# Patient Record
Sex: FEMALE | Race: WHITE | NOT HISPANIC OR LATINO | Employment: OTHER | URBAN - METROPOLITAN AREA
[De-identification: names, ages, dates, MRNs, and addresses within clinical notes are randomized per-mention and may not be internally consistent; named-entity substitution may affect disease eponyms.]

---

## 2023-04-18 ENCOUNTER — TELEPHONE (OUTPATIENT)
Dept: PRIMARY CARE | Facility: CLINIC | Age: 78
End: 2023-04-18
Payer: MEDICARE

## 2023-04-18 DIAGNOSIS — J06.9 ACUTE URI: Primary | ICD-10-CM

## 2023-04-18 RX ORDER — AZITHROMYCIN 250 MG/1
TABLET, FILM COATED ORAL
Qty: 6 TABLET | Refills: 0 | Status: SHIPPED | OUTPATIENT
Start: 2023-04-18 | End: 2023-04-23

## 2023-04-18 NOTE — TELEPHONE ENCOUNTER
Pt's  was diagnosed with two viruses (Macario Thacker- Dr Peralta's pt) and was given a Z pack. She woke up this morning 4/18 with the same symptoms as him  Sore throat, body aches, congestion, fever.   Could you send something to [facility]? She is COVID negative

## 2023-05-05 PROBLEM — I25.10 ARTERIOSCLEROSIS OF CORONARY ARTERY: Status: ACTIVE | Noted: 2023-05-05

## 2023-05-05 PROBLEM — D64.9 ANEMIA: Status: ACTIVE | Noted: 2023-05-05

## 2023-05-05 PROBLEM — R00.0 WIDE-COMPLEX TACHYCARDIA: Status: ACTIVE | Noted: 2023-05-05

## 2023-05-05 PROBLEM — M79.2 NEURALGIA: Status: ACTIVE | Noted: 2023-05-05

## 2023-05-05 PROBLEM — R05.9 COUGH: Status: ACTIVE | Noted: 2023-05-05

## 2023-05-05 PROBLEM — I10 BENIGN ESSENTIAL HYPERTENSION: Status: ACTIVE | Noted: 2023-05-05

## 2023-05-05 PROBLEM — M25.512 LEFT SHOULDER PAIN: Status: ACTIVE | Noted: 2023-05-05

## 2023-05-05 PROBLEM — I35.0 AORTIC VALVE STENOSIS: Status: ACTIVE | Noted: 2023-05-05

## 2023-05-05 PROBLEM — E78.5 HYPERLIPIDEMIA: Status: ACTIVE | Noted: 2023-05-05

## 2023-05-05 PROBLEM — J10.1 INFLUENZA A: Status: ACTIVE | Noted: 2023-05-05

## 2023-05-05 PROBLEM — J30.9 ALLERGIC RHINITIS: Status: ACTIVE | Noted: 2023-05-05

## 2023-05-05 PROBLEM — Z98.61 POST PTCA: Status: ACTIVE | Noted: 2023-05-05

## 2023-05-05 PROBLEM — Z86.79 HISTORY OF PSVT (PAROXYSMAL SUPRAVENTRICULAR TACHYCARDIA): Status: ACTIVE | Noted: 2023-05-05

## 2023-05-05 PROBLEM — R01.1 MURMUR: Status: ACTIVE | Noted: 2023-05-05

## 2023-05-05 RX ORDER — OSELTAMIVIR PHOSPHATE 75 MG/1
1 CAPSULE ORAL 2 TIMES DAILY
COMMUNITY
Start: 2022-12-01 | End: 2023-06-01 | Stop reason: WASHOUT

## 2023-05-05 RX ORDER — ATORVASTATIN CALCIUM 20 MG/1
1 TABLET, FILM COATED ORAL NIGHTLY
COMMUNITY
Start: 2020-02-18 | End: 2024-01-24 | Stop reason: SDUPTHER

## 2023-05-05 RX ORDER — GABAPENTIN 300 MG/1
CAPSULE ORAL
COMMUNITY
End: 2023-06-01 | Stop reason: WASHOUT

## 2023-05-05 RX ORDER — ASPIRIN 81 MG/1
TABLET ORAL
COMMUNITY
Start: 2020-02-18

## 2023-05-05 RX ORDER — MULTIVITAMIN
1 TABLET ORAL DAILY
COMMUNITY

## 2023-05-05 RX ORDER — METOPROLOL TARTRATE 25 MG/1
0.5 TABLET, FILM COATED ORAL 2 TIMES DAILY
COMMUNITY
End: 2024-01-24 | Stop reason: SDUPTHER

## 2023-05-05 RX ORDER — LOSARTAN POTASSIUM 50 MG/1
1 TABLET ORAL 2 TIMES DAILY
COMMUNITY
Start: 2019-02-14 | End: 2024-01-24 | Stop reason: SDUPTHER

## 2023-05-05 RX ORDER — ACETAMINOPHEN AND CODEINE PHOSPHATE 300; 30 MG/1; MG/1
TABLET ORAL EVERY 6 HOURS
COMMUNITY
Start: 2019-10-22 | End: 2023-06-01 | Stop reason: SDUPTHER

## 2023-06-01 ENCOUNTER — OFFICE VISIT (OUTPATIENT)
Dept: PRIMARY CARE | Facility: CLINIC | Age: 78
End: 2023-06-01
Payer: MEDICARE

## 2023-06-01 VITALS
RESPIRATION RATE: 16 BRPM | HEART RATE: 72 BPM | TEMPERATURE: 96.9 F | OXYGEN SATURATION: 96 % | WEIGHT: 157 LBS | DIASTOLIC BLOOD PRESSURE: 80 MMHG | BODY MASS INDEX: 26.8 KG/M2 | HEIGHT: 64 IN | SYSTOLIC BLOOD PRESSURE: 138 MMHG

## 2023-06-01 DIAGNOSIS — G89.29 CHRONIC PAIN OF LEFT KNEE: ICD-10-CM

## 2023-06-01 DIAGNOSIS — M25.562 CHRONIC PAIN OF LEFT KNEE: ICD-10-CM

## 2023-06-01 DIAGNOSIS — G89.29 CHRONIC LEFT SHOULDER PAIN: ICD-10-CM

## 2023-06-01 DIAGNOSIS — Z23 NEED FOR SHINGLES VACCINE: Primary | ICD-10-CM

## 2023-06-01 DIAGNOSIS — M25.512 CHRONIC LEFT SHOULDER PAIN: ICD-10-CM

## 2023-06-01 PROBLEM — E86.0 DEHYDRATION: Status: ACTIVE | Noted: 2020-11-16

## 2023-06-01 PROBLEM — J10.1 INFLUENZA A: Status: RESOLVED | Noted: 2023-05-05 | Resolved: 2023-06-01

## 2023-06-01 PROBLEM — R05.9 COUGH: Status: RESOLVED | Noted: 2023-05-05 | Resolved: 2023-06-01

## 2023-06-01 PROBLEM — E86.0 DEHYDRATION: Status: RESOLVED | Noted: 2020-11-16 | Resolved: 2023-06-01

## 2023-06-01 PROCEDURE — 1159F MED LIST DOCD IN RCRD: CPT | Performed by: FAMILY MEDICINE

## 2023-06-01 PROCEDURE — 20610 DRAIN/INJ JOINT/BURSA W/O US: CPT | Performed by: FAMILY MEDICINE

## 2023-06-01 PROCEDURE — 3075F SYST BP GE 130 - 139MM HG: CPT | Performed by: FAMILY MEDICINE

## 2023-06-01 PROCEDURE — 99213 OFFICE O/P EST LOW 20 MIN: CPT | Performed by: FAMILY MEDICINE

## 2023-06-01 PROCEDURE — 1160F RVW MEDS BY RX/DR IN RCRD: CPT | Performed by: FAMILY MEDICINE

## 2023-06-01 PROCEDURE — 3079F DIAST BP 80-89 MM HG: CPT | Performed by: FAMILY MEDICINE

## 2023-06-01 PROCEDURE — 1157F ADVNC CARE PLAN IN RCRD: CPT | Performed by: FAMILY MEDICINE

## 2023-06-01 PROCEDURE — 1036F TOBACCO NON-USER: CPT | Performed by: FAMILY MEDICINE

## 2023-06-01 RX ORDER — ACETAMINOPHEN AND CODEINE PHOSPHATE 300; 30 MG/1; MG/1
1 TABLET ORAL EVERY 6 HOURS PRN
Qty: 24 TABLET | Refills: 0 | Status: SHIPPED | OUTPATIENT
Start: 2023-06-01 | End: 2023-06-07

## 2023-06-01 SDOH — ECONOMIC STABILITY: FOOD INSECURITY: WITHIN THE PAST 12 MONTHS, YOU WORRIED THAT YOUR FOOD WOULD RUN OUT BEFORE YOU GOT MONEY TO BUY MORE.: NEVER TRUE

## 2023-06-01 SDOH — ECONOMIC STABILITY: FOOD INSECURITY: WITHIN THE PAST 12 MONTHS, THE FOOD YOU BOUGHT JUST DIDN'T LAST AND YOU DIDN'T HAVE MONEY TO GET MORE.: NEVER TRUE

## 2023-06-01 ASSESSMENT — ENCOUNTER SYMPTOMS
LOSS OF SENSATION IN FEET: 0
OCCASIONAL FEELINGS OF UNSTEADINESS: 1
DEPRESSION: 0

## 2023-06-01 ASSESSMENT — LIFESTYLE VARIABLES
SKIP TO QUESTIONS 9-10: 1
HOW MANY STANDARD DRINKS CONTAINING ALCOHOL DO YOU HAVE ON A TYPICAL DAY: 1 OR 2
HOW OFTEN DO YOU HAVE SIX OR MORE DRINKS ON ONE OCCASION: NEVER
AUDIT-C TOTAL SCORE: 2
HOW OFTEN DO YOU HAVE A DRINK CONTAINING ALCOHOL: 2-4 TIMES A MONTH

## 2023-06-01 ASSESSMENT — PATIENT HEALTH QUESTIONNAIRE - PHQ9
1. LITTLE INTEREST OR PLEASURE IN DOING THINGS: NOT AT ALL
2. FEELING DOWN, DEPRESSED OR HOPELESS: NOT AT ALL
SUM OF ALL RESPONSES TO PHQ9 QUESTIONS 1 & 2: 0

## 2023-06-01 NOTE — PATIENT INSTRUCTIONS
Thank you for choosing [facility] for your healthcare.   As always if you have any questions or concerns please do not hesitate to call our office at [phone] or through [employer].    Have a great day!  Nancy Lao, DO

## 2023-06-01 NOTE — ASSESSMENT & PLAN NOTE
Flairs up intermittently  Rx tylenol with codeine #24 tabs provided  CSA signed. UDS not indicated for short-term rx

## 2023-06-01 NOTE — PROGRESS NOTES
Subjective   Patient ID: Nat Thacker is a 78 y.o. female who presents for Follow-up (6 month follow-up. Asking about shingles vaccine.), Hip Pain (L side x 2 months), and Knee Pain (L side X 2 months).  Left shoulder pain  She has intermittent left shoulder pain. She occasionally takes Tylenol with codeine when the pain is severe. SHe has not needed it for several months but would like a refill to have on hand for future.     She is concerned about left knee pain that started about 2 months ago. She has also been having left buttock pain possibly from walking differently due to the knee pain. She is cautious on the stairs. She can still do everything she needs to do.       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 She was scheduled for colostomy take down at the end of February 2020 but the surgery was postponed due to CAD. She underwent colostomy take down in August 2020 which was complicated by leaking at anastomosis and required ileostomy. She had a hysterectomy at the same time. On December 8th 2020 she had ileostomy take down and has fully recovered.         Current Outpatient Medications   Medication Sig Dispense Refill    aspirin 81 mg EC tablet Take by mouth.      atorvastatin (Lipitor) 20 mg tablet Take 1 tablet (20 mg) by mouth once daily at bedtime.      losartan (Cozaar) 50 mg tablet Take 1 tablet (50 mg) by mouth 2 times a day.      metoprolol tartrate (Lopressor) 25 mg tablet Take 0.5 tablets (12.5 mg) by mouth 2 times a day.      multivitamin tablet Take 1 tablet by mouth once daily.      acetaminophen-codeine (Tylenol w/ Codeine #3) 300-30 mg tablet Take 1 tablet by mouth every 6 hours if needed for severe pain (7 - 10) for up to 6 days. 24 tablet 0    zoster vaccine-recombinant  "adjuvanted (Shingrix) 50 mcg/0.5 mL vaccine Inject 0.5 mL into the shoulder, thigh, or buttocks 1 time for 1 dose. 0.5 mL 1     No current facility-administered medications for this visit.       Objective     Visit Vitals  /80 (BP Location: Right arm, Patient Position: Sitting, BP Cuff Size: Large adult)   Pulse 72   Temp 36.1 °C (96.9 °F)   Resp 16   Ht 1.626 m (5' 4\")   Wt 71.2 kg (157 lb)   SpO2 96%   BMI 26.95 kg/m²   Smoking Status Former   BSA 1.79 m²        Constitutional: Well nourished, well developed, appears stated age  Eyes: no scleral icterus, no conjunctival injection  Ears: normal external auditory canal, no retraction or bulging of tympanic membranes  Neck: no thyromegaly  Cardiovascular: regular rate and rhythm, systolic murmur, no leg edema  Respiratory: normal respiratory effort, clear to auscultation bilaterally  Musculoskeletal: No gross deformities appreciated  Skin: Warm, dry. No rashes  Neurologic: Alert, CNs II-XII grossly intact..  Psych: Appropriate mood and affect.      Knee Injection  Indication: left knee pain,   Performed by: Nancy Lao DO    Indications, risks, and benefits explained to patient and informed consent obtained.  The left  knee was prepped with alcohol swab. The skin was anesthetized with ethyl chloride topical spray. A 22 gauge needle was then inserted lateral to the patella into the joint space, and 40 mg of Kenalog (0.5 mL of Kenalog 80 mg/mL) with 2 mL of lidocaine 1% was injected in to the joint space. The needle was removed and a Bandaid was placed over the site. The patient tolerated the procedure well.     Nancy Lao DO        Assessment/Plan   Problem List Items Addressed This Visit          Musculoskeletal    Left shoulder pain     Flairs up intermittently  Rx tylenol with codeine #24 tabs provided  CSA signed. UDS not indicated for short-term rx         Relevant Medications    acetaminophen-codeine (Tylenol w/ Codeine #3) 300-30 mg tablet "     Other Visit Diagnoses       Need for shingles vaccine    -  Primary    Relevant Medications    zoster vaccine-recombinant adjuvanted (Shingrix) 50 mcg/0.5 mL vaccine    Chronic pain of left knee        Relevant Medications    triamcinolone acetonide (Kenalog-80) injection 80 mg (Completed) (Start on 6/1/2023 10:00 AM)          Follow up 6 months.    Virginia Factor, DO

## 2023-12-04 ENCOUNTER — TELEPHONE (OUTPATIENT)
Dept: PRIMARY CARE | Facility: CLINIC | Age: 78
End: 2023-12-04
Payer: MEDICARE

## 2023-12-04 DIAGNOSIS — I10 BENIGN ESSENTIAL HYPERTENSION: ICD-10-CM

## 2023-12-04 DIAGNOSIS — E78.2 MIXED HYPERLIPIDEMIA: ICD-10-CM

## 2023-12-04 DIAGNOSIS — M79.2 NEURALGIA: Primary | ICD-10-CM

## 2023-12-04 NOTE — TELEPHONE ENCOUNTER
Pt has an appt on Dec 7 and was wondering if she is needing any blood work.  If so could we please put it in so she can get it before her appt.  Thanks:)

## 2023-12-06 ENCOUNTER — LAB (OUTPATIENT)
Dept: LAB | Facility: LAB | Age: 78
End: 2023-12-06
Payer: MEDICARE

## 2023-12-06 DIAGNOSIS — E78.2 MIXED HYPERLIPIDEMIA: ICD-10-CM

## 2023-12-06 DIAGNOSIS — I10 BENIGN ESSENTIAL HYPERTENSION: ICD-10-CM

## 2023-12-06 DIAGNOSIS — M79.2 NEURALGIA: ICD-10-CM

## 2023-12-06 LAB
ALBUMIN SERPL BCP-MCNC: 4 G/DL (ref 3.4–5)
ALP SERPL-CCNC: 72 U/L (ref 33–136)
ALT SERPL W P-5'-P-CCNC: 23 U/L (ref 7–45)
ANION GAP SERPL CALC-SCNC: 9 MMOL/L (ref 10–20)
AST SERPL W P-5'-P-CCNC: 22 U/L (ref 9–39)
BILIRUB SERPL-MCNC: 0.4 MG/DL (ref 0–1.2)
BUN SERPL-MCNC: 19 MG/DL (ref 6–23)
CALCIUM SERPL-MCNC: 8.8 MG/DL (ref 8.6–10.3)
CHLORIDE SERPL-SCNC: 108 MMOL/L (ref 98–107)
CHOLEST SERPL-MCNC: 162 MG/DL (ref 0–199)
CHOLESTEROL/HDL RATIO: 2.4
CO2 SERPL-SCNC: 27 MMOL/L (ref 21–32)
CREAT SERPL-MCNC: 1.09 MG/DL (ref 0.5–1.05)
ERYTHROCYTE [DISTWIDTH] IN BLOOD BY AUTOMATED COUNT: 13.8 % (ref 11.5–14.5)
GFR SERPL CREATININE-BSD FRML MDRD: 52 ML/MIN/1.73M*2
GLUCOSE SERPL-MCNC: 102 MG/DL (ref 74–99)
HCT VFR BLD AUTO: 39.9 % (ref 36–46)
HDLC SERPL-MCNC: 66.8 MG/DL
HGB BLD-MCNC: 12.5 G/DL (ref 12–16)
LDLC SERPL CALC-MCNC: 70 MG/DL
MCH RBC QN AUTO: 28.7 PG (ref 26–34)
MCHC RBC AUTO-ENTMCNC: 31.3 G/DL (ref 32–36)
MCV RBC AUTO: 92 FL (ref 80–100)
NON HDL CHOLESTEROL: 95 MG/DL (ref 0–149)
NRBC BLD-RTO: 0 /100 WBCS (ref 0–0)
PLATELET # BLD AUTO: 342 X10*3/UL (ref 150–450)
POTASSIUM SERPL-SCNC: 4.1 MMOL/L (ref 3.5–5.3)
PROT SERPL-MCNC: 6.5 G/DL (ref 6.4–8.2)
RBC # BLD AUTO: 4.35 X10*6/UL (ref 4–5.2)
SODIUM SERPL-SCNC: 140 MMOL/L (ref 136–145)
TRIGL SERPL-MCNC: 124 MG/DL (ref 0–149)
VIT B12 SERPL-MCNC: 250 PG/ML (ref 211–911)
VLDL: 25 MG/DL (ref 0–40)
WBC # BLD AUTO: 7.4 X10*3/UL (ref 4.4–11.3)

## 2023-12-06 PROCEDURE — 36415 COLL VENOUS BLD VENIPUNCTURE: CPT

## 2023-12-06 PROCEDURE — 80061 LIPID PANEL: CPT

## 2023-12-06 PROCEDURE — 80053 COMPREHEN METABOLIC PANEL: CPT

## 2023-12-06 PROCEDURE — 82607 VITAMIN B-12: CPT

## 2023-12-06 PROCEDURE — 85027 COMPLETE CBC AUTOMATED: CPT

## 2023-12-07 ENCOUNTER — OFFICE VISIT (OUTPATIENT)
Dept: PRIMARY CARE | Facility: CLINIC | Age: 78
End: 2023-12-07
Payer: MEDICARE

## 2023-12-07 VITALS
OXYGEN SATURATION: 97 % | SYSTOLIC BLOOD PRESSURE: 154 MMHG | BODY MASS INDEX: 26.8 KG/M2 | TEMPERATURE: 96.6 F | RESPIRATION RATE: 16 BRPM | DIASTOLIC BLOOD PRESSURE: 84 MMHG | HEART RATE: 74 BPM | WEIGHT: 157 LBS | HEIGHT: 64 IN

## 2023-12-07 DIAGNOSIS — M25.512 CHRONIC LEFT SHOULDER PAIN: ICD-10-CM

## 2023-12-07 DIAGNOSIS — M54.50 CHRONIC RIGHT-SIDED LOW BACK PAIN WITHOUT SCIATICA: ICD-10-CM

## 2023-12-07 DIAGNOSIS — Z23 NEED FOR PNEUMOCOCCAL 20-VALENT CONJUGATE VACCINATION: ICD-10-CM

## 2023-12-07 DIAGNOSIS — G89.29 CHRONIC LEFT SHOULDER PAIN: ICD-10-CM

## 2023-12-07 DIAGNOSIS — G89.29 CHRONIC PAIN OF RIGHT KNEE: ICD-10-CM

## 2023-12-07 DIAGNOSIS — R94.4 DECREASED GFR: ICD-10-CM

## 2023-12-07 DIAGNOSIS — R73.9 HYPERGLYCEMIA: ICD-10-CM

## 2023-12-07 DIAGNOSIS — G89.29 CHRONIC RIGHT-SIDED LOW BACK PAIN WITHOUT SCIATICA: ICD-10-CM

## 2023-12-07 DIAGNOSIS — Z23 NEED FOR SHINGLES VACCINE: ICD-10-CM

## 2023-12-07 DIAGNOSIS — Z00.00 ROUTINE GENERAL MEDICAL EXAMINATION AT HEALTH CARE FACILITY: Primary | ICD-10-CM

## 2023-12-07 DIAGNOSIS — E53.8 VITAMIN B12 DEFICIENCY: ICD-10-CM

## 2023-12-07 DIAGNOSIS — M25.561 CHRONIC PAIN OF RIGHT KNEE: ICD-10-CM

## 2023-12-07 PROBLEM — I10 BENIGN ESSENTIAL HYPERTENSION: Chronic | Status: ACTIVE | Noted: 2023-05-05

## 2023-12-07 PROBLEM — E78.1 PURE HYPERTRIGLYCERIDEMIA: Status: ACTIVE | Noted: 2023-05-05

## 2023-12-07 PROCEDURE — 1036F TOBACCO NON-USER: CPT | Performed by: FAMILY MEDICINE

## 2023-12-07 PROCEDURE — 1170F FXNL STATUS ASSESSED: CPT | Performed by: FAMILY MEDICINE

## 2023-12-07 PROCEDURE — 3079F DIAST BP 80-89 MM HG: CPT | Performed by: FAMILY MEDICINE

## 2023-12-07 PROCEDURE — 1159F MED LIST DOCD IN RCRD: CPT | Performed by: FAMILY MEDICINE

## 2023-12-07 PROCEDURE — 3077F SYST BP >= 140 MM HG: CPT | Performed by: FAMILY MEDICINE

## 2023-12-07 PROCEDURE — G0009 ADMIN PNEUMOCOCCAL VACCINE: HCPCS | Performed by: FAMILY MEDICINE

## 2023-12-07 PROCEDURE — G0439 PPPS, SUBSEQ VISIT: HCPCS | Performed by: FAMILY MEDICINE

## 2023-12-07 PROCEDURE — 1160F RVW MEDS BY RX/DR IN RCRD: CPT | Performed by: FAMILY MEDICINE

## 2023-12-07 PROCEDURE — 1125F AMNT PAIN NOTED PAIN PRSNT: CPT | Performed by: FAMILY MEDICINE

## 2023-12-07 PROCEDURE — 90677 PCV20 VACCINE IM: CPT | Performed by: FAMILY MEDICINE

## 2023-12-07 PROCEDURE — 99213 OFFICE O/P EST LOW 20 MIN: CPT | Performed by: FAMILY MEDICINE

## 2023-12-07 RX ORDER — ACETAMINOPHEN AND CODEINE PHOSPHATE 300; 30 MG/1; MG/1
1 TABLET ORAL EVERY 6 HOURS PRN
Qty: 24 TABLET | Refills: 0 | Status: SHIPPED | OUTPATIENT
Start: 2023-12-07 | End: 2023-12-13

## 2023-12-07 SDOH — ECONOMIC STABILITY: FOOD INSECURITY: WITHIN THE PAST 12 MONTHS, YOU WORRIED THAT YOUR FOOD WOULD RUN OUT BEFORE YOU GOT MONEY TO BUY MORE.: NEVER TRUE

## 2023-12-07 SDOH — ECONOMIC STABILITY: FOOD INSECURITY: WITHIN THE PAST 12 MONTHS, THE FOOD YOU BOUGHT JUST DIDN'T LAST AND YOU DIDN'T HAVE MONEY TO GET MORE.: NEVER TRUE

## 2023-12-07 ASSESSMENT — ACTIVITIES OF DAILY LIVING (ADL)
GROCERY_SHOPPING: INDEPENDENT
MANAGING_FINANCES: INDEPENDENT
TAKING_MEDICATION: INDEPENDENT
DOING_HOUSEWORK: INDEPENDENT
BATHING: INDEPENDENT
DRESSING: INDEPENDENT

## 2023-12-07 ASSESSMENT — ENCOUNTER SYMPTOMS
LOSS OF SENSATION IN FEET: 0
OCCASIONAL FEELINGS OF UNSTEADINESS: 0
DEPRESSION: 0

## 2023-12-07 ASSESSMENT — LIFESTYLE VARIABLES
SKIP TO QUESTIONS 9-10: 1
HOW OFTEN DO YOU HAVE A DRINK CONTAINING ALCOHOL: NEVER
HOW MANY STANDARD DRINKS CONTAINING ALCOHOL DO YOU HAVE ON A TYPICAL DAY: PATIENT DOES NOT DRINK
AUDIT-C TOTAL SCORE: 0
HOW OFTEN DO YOU HAVE SIX OR MORE DRINKS ON ONE OCCASION: NEVER

## 2023-12-07 ASSESSMENT — PAIN SCALES - GENERAL: PAINLEVEL: 2

## 2023-12-07 NOTE — PROGRESS NOTES
Subjective   Patient ID: Nat Thacker is a 78 y.o. female who presents for Medicare Annual Wellness Visit Subsequent.  She has been experiencing shortness of breath with certain activities since her last visit. She followed up with cardiology and is scheduled for some tests in January.     She is concerned about right knee which has given out of her on occasion. She has pain when going up steps so she has changed the way she goes up them. Does not usually have pain when walking.     She has low back pain with prolonged bending forward like making the bed.     She is getting up at night to urinate 2-3 times for the 3-4 months. Father  from renal failure in his 50s.     She is babysitting her great grandchildren 2 days per week.         In addition to that documented in the HPI above, the additional ROS was obtained:  Constitutional: Denies fevers or chills  Eyes: Denies vision changes  ENMT: Denies trouble swallowing  Cardiovascular: Denies chest pain or heart racing  Respiratory: Denies SOB or cough  Gastrointestinal: Denies nausea, vomiting, diarrhea or constipation      Current Outpatient Medications   Medication Sig Dispense Refill    aspirin 81 mg EC tablet Take by mouth.      atorvastatin (Lipitor) 20 mg tablet Take 1 tablet (20 mg) by mouth once daily at bedtime.      losartan (Cozaar) 50 mg tablet Take 1 tablet (50 mg) by mouth 2 times a day.      metoprolol tartrate (Lopressor) 25 mg tablet Take 0.5 tablets (12.5 mg) by mouth 2 times a day.      multivitamin tablet Take 1 tablet by mouth once daily.      acetaminophen-codeine (Tylenol w/ Codeine #3) 300-30 mg tablet Take 1 tablet by mouth every 6 hours if needed for severe pain (7 - 10) for up to 6 days. 24 tablet 0    zoster vaccine-recombinant adjuvanted (Shingrix) 50 mcg/0.5 mL vaccine Inject 0.5 mL into the muscle 1 time for 1 dose. 0.5 mL 1     No current facility-administered medications for this visit.       Objective     Visit Vitals  /84  "(BP Location: Left arm, Patient Position: Sitting, BP Cuff Size: Adult)   Pulse 74   Temp 35.9 °C (96.6 °F) (Temporal)   Resp 16   Ht 1.626 m (5' 4\")   Wt 71.2 kg (157 lb)   SpO2 97%   BMI 26.95 kg/m²   Smoking Status Former   BSA 1.79 m²        Constitutional: Well nourished, well developed, appears stated age  Eyes: no scleral icterus, no conjunctival injection  Ears: normal external auditory canal, no retraction or bulging of tympanic membranes  Neck: no thyromegaly  Cardiovascular: regular rate and rhythm, loud systolic murmur, no leg edema  Respiratory: normal respiratory effort, clear to auscultation bilaterally  Musculoskeletal: No gross deformities appreciated  Skin: Warm, dry. No rashes  Neurologic: Alert, CNs II-XII grossly intact..  Psych: Appropriate mood and affect.        Assessment/Plan   Problem List Items Addressed This Visit       Chronic left shoulder pain (Chronic)     Flairs up intermittently  Rx tylenol with codeine #24 tabs provided  CSA on file. UDS not indicated for short-term rx (usually needs rx twice a year)         Relevant Medications    acetaminophen-codeine (Tylenol w/ Codeine #3) 300-30 mg tablet     Other Visit Diagnoses       Routine general medical examination at health care facility    -  Primary    [id number] given today  Rx for Shingrix printed    Decreased GFR        Relevant Orders    Basic metabolic panel    Hyperglycemia        Relevant Orders    Hemoglobin A1c    Vitamin B12 deficiency        Relevant Orders    Vitamin B12    Chronic pain of right knee        Relevant Orders    XR knee right 1-2 views    Chronic right-sided low back pain without sciatica        Relevant Medications    acetaminophen-codeine (Tylenol w/ Codeine #3) 300-30 mg tablet    Other Relevant Orders    XR lumbar spine 2-3 views    Need for shingles vaccine        Relevant Medications    zoster vaccine-recombinant adjuvanted (Shingrix) 50 mcg/0.5 mL vaccine    Need for pneumococcal 20-valent conjugate " vaccination        Relevant Orders    Pneumococcal conjugate vaccine, 20-valent (PREVNAR 20) (Completed)              All pertinent lab work and results were reviewed with patient.     Follow up 6-7 months.    Nancy Lao, DO

## 2023-12-07 NOTE — PATIENT INSTRUCTIONS
Thank you for choosing [facility] for your healthcare.   As always if you have any questions or concerns please do not hesitate to call our office at [phone] or through [employer].    Have a great day!  Nancy Lao, DO

## 2023-12-07 NOTE — ASSESSMENT & PLAN NOTE
Flairs up intermittently  Rx tylenol with codeine #24 tabs provided  CSA on file. UDS not indicated for short-term rx (usually needs rx twice a year)

## 2023-12-13 ENCOUNTER — ANCILLARY PROCEDURE (OUTPATIENT)
Dept: RADIOLOGY | Facility: CLINIC | Age: 78
End: 2023-12-13
Payer: MEDICARE

## 2023-12-13 DIAGNOSIS — M25.561 CHRONIC PAIN OF RIGHT KNEE: ICD-10-CM

## 2023-12-13 DIAGNOSIS — G89.29 CHRONIC PAIN OF RIGHT KNEE: ICD-10-CM

## 2023-12-13 PROCEDURE — 73560 X-RAY EXAM OF KNEE 1 OR 2: CPT | Mod: RT,FY

## 2023-12-29 ENCOUNTER — LAB (OUTPATIENT)
Dept: LAB | Facility: LAB | Age: 78
End: 2023-12-29
Payer: MEDICARE

## 2023-12-29 DIAGNOSIS — R94.4 DECREASED GFR: ICD-10-CM

## 2023-12-29 DIAGNOSIS — R73.9 HYPERGLYCEMIA: ICD-10-CM

## 2023-12-29 DIAGNOSIS — E53.8 VITAMIN B12 DEFICIENCY: ICD-10-CM

## 2023-12-29 LAB
ANION GAP SERPL CALC-SCNC: 10 MMOL/L (ref 10–20)
BUN SERPL-MCNC: 19 MG/DL (ref 6–23)
CALCIUM SERPL-MCNC: 8.7 MG/DL (ref 8.6–10.3)
CHLORIDE SERPL-SCNC: 106 MMOL/L (ref 98–107)
CO2 SERPL-SCNC: 28 MMOL/L (ref 21–32)
CREAT SERPL-MCNC: 1.06 MG/DL (ref 0.5–1.05)
EST. AVERAGE GLUCOSE BLD GHB EST-MCNC: 123 MG/DL
GFR SERPL CREATININE-BSD FRML MDRD: 54 ML/MIN/1.73M*2
GLUCOSE SERPL-MCNC: 83 MG/DL (ref 74–99)
HBA1C MFR BLD: 5.9 %
POTASSIUM SERPL-SCNC: 4.3 MMOL/L (ref 3.5–5.3)
SODIUM SERPL-SCNC: 140 MMOL/L (ref 136–145)
VIT B12 SERPL-MCNC: 627 PG/ML (ref 211–911)

## 2023-12-29 PROCEDURE — 82607 VITAMIN B-12: CPT

## 2023-12-29 PROCEDURE — 36415 COLL VENOUS BLD VENIPUNCTURE: CPT

## 2023-12-29 PROCEDURE — 80048 BASIC METABOLIC PNL TOTAL CA: CPT

## 2023-12-29 PROCEDURE — 83036 HEMOGLOBIN GLYCOSYLATED A1C: CPT

## 2024-01-08 ENCOUNTER — PATIENT MESSAGE (OUTPATIENT)
Dept: PRIMARY CARE | Facility: CLINIC | Age: 79
End: 2024-01-08
Payer: MEDICARE

## 2024-01-08 DIAGNOSIS — M25.551 RIGHT HIP PAIN: Primary | ICD-10-CM

## 2024-01-15 ENCOUNTER — HOSPITAL ENCOUNTER (OUTPATIENT)
Dept: CARDIOLOGY | Facility: HOSPITAL | Age: 79
Discharge: HOME | End: 2024-01-15
Payer: MEDICARE

## 2024-01-15 DIAGNOSIS — I35.0 NONRHEUMATIC AORTIC (VALVE) STENOSIS: ICD-10-CM

## 2024-01-15 LAB
AORTIC VALVE MEAN GRADIENT: 13.1
AORTIC VALVE PEAK VELOCITY: 2.59
AV PEAK GRADIENT: 26.9
AVA (PEAK VEL): 1.39
AVA (VTI): 1.29
EJECTION FRACTION APICAL 4 CHAMBER: 78.6
EJECTION FRACTION: 72
LEFT ATRIUM VOLUME AREA LENGTH INDEX BSA: 21.7
LEFT VENTRICLE INTERNAL DIMENSION DIASTOLE: 4.72 (ref 3.5–6)
LEFT VENTRICULAR OUTFLOW TRACT DIAMETER: 1.97
MITRAL VALVE E/A RATIO: 0.86
MITRAL VALVE E/E' RATIO: 13.43
RIGHT VENTRICLE FREE WALL PEAK S': 14.46
TRICUSPID ANNULAR PLANE SYSTOLIC EXCURSION: 2.4

## 2024-01-15 PROCEDURE — 93306 TTE W/DOPPLER COMPLETE: CPT | Performed by: INTERNAL MEDICINE

## 2024-01-15 PROCEDURE — 93306 TTE W/DOPPLER COMPLETE: CPT

## 2024-01-17 ENCOUNTER — ANCILLARY PROCEDURE (OUTPATIENT)
Dept: RADIOLOGY | Facility: CLINIC | Age: 79
End: 2024-01-17
Payer: MEDICARE

## 2024-01-17 DIAGNOSIS — M25.551 RIGHT HIP PAIN: ICD-10-CM

## 2024-01-17 PROCEDURE — 73501 X-RAY EXAM HIP UNI 1 VIEW: CPT | Mod: RT

## 2024-01-17 PROCEDURE — 73501 X-RAY EXAM HIP UNI 1 VIEW: CPT | Mod: RIGHT SIDE | Performed by: STUDENT IN AN ORGANIZED HEALTH CARE EDUCATION/TRAINING PROGRAM

## 2024-01-23 PROBLEM — L25.9 CONTACT DERMATITIS: Status: ACTIVE | Noted: 2024-01-23

## 2024-01-23 PROBLEM — Z93.3 STATUS POST COLOSTOMY (MULTI): Status: ACTIVE | Noted: 2024-01-23

## 2024-01-23 PROBLEM — E78.5 HYPERLIPIDEMIA: Status: ACTIVE | Noted: 2023-05-05

## 2024-01-23 PROBLEM — R73.9 HYPERGLYCEMIA: Status: ACTIVE | Noted: 2023-12-29

## 2024-01-23 PROBLEM — F43.23 ADJUSTMENT DISORDER WITH MIXED ANXIETY AND DEPRESSED MOOD: Status: ACTIVE | Noted: 2024-01-23

## 2024-01-23 PROBLEM — M25.569 KNEE PAIN: Status: ACTIVE | Noted: 2024-01-23

## 2024-01-23 PROBLEM — B02.9 HERPES ZOSTER: Status: ACTIVE | Noted: 2024-01-23

## 2024-01-23 PROBLEM — N60.39 FIBROSCLEROSIS OF BREAST: Status: ACTIVE | Noted: 2024-01-23

## 2024-01-23 PROBLEM — I47.20 WIDE QRS VENTRICULAR TACHYCARDIA (MULTI): Status: ACTIVE | Noted: 2024-01-23

## 2024-01-23 PROBLEM — M79.2 NEURALGIA AND NEURITIS, UNSPECIFIED: Status: ACTIVE | Noted: 2023-05-05

## 2024-01-23 PROBLEM — Z85.828 HISTORY OF MALIGNANT NEOPLASM OF SKIN: Status: ACTIVE | Noted: 2024-01-23

## 2024-01-23 PROBLEM — M54.50 CHRONIC LOW BACK PAIN: Status: ACTIVE | Noted: 2023-05-05

## 2024-01-23 PROBLEM — K57.32 DIVERTICULITIS OF COLON: Status: ACTIVE | Noted: 2024-01-23

## 2024-01-23 RX ORDER — VALACYCLOVIR HYDROCHLORIDE 1 G/1
1000 TABLET, FILM COATED ORAL EVERY 8 HOURS
COMMUNITY
Start: 2021-06-28 | End: 2024-03-11 | Stop reason: WASHOUT

## 2024-01-23 RX ORDER — BIOTIN 1 MG
1 TABLET ORAL
COMMUNITY
Start: 2019-08-13 | End: 2024-03-11 | Stop reason: WASHOUT

## 2024-01-23 RX ORDER — HYDRALAZINE HYDROCHLORIDE 50 MG/1
50 TABLET, FILM COATED ORAL 2 TIMES DAILY
COMMUNITY
End: 2024-01-24 | Stop reason: WASHOUT

## 2024-01-23 RX ORDER — ONDANSETRON HYDROCHLORIDE 8 MG/1
8 TABLET, FILM COATED ORAL EVERY 8 HOURS PRN
COMMUNITY
Start: 2020-10-05 | End: 2024-03-11 | Stop reason: WASHOUT

## 2024-01-23 RX ORDER — OMEGA-3-ACID ETHYL ESTERS 1 G/1
1 CAPSULE, LIQUID FILLED ORAL
COMMUNITY
Start: 2019-08-13 | End: 2024-03-11 | Stop reason: WASHOUT

## 2024-01-23 RX ORDER — POTASSIUM CHLORIDE 20 MEQ/1
20 TABLET, EXTENDED RELEASE ORAL DAILY
COMMUNITY
End: 2024-03-11 | Stop reason: WASHOUT

## 2024-01-23 RX ORDER — PROMETHAZINE HYDROCHLORIDE 25 MG/1
25 TABLET ORAL EVERY 8 HOURS
COMMUNITY
End: 2024-03-11 | Stop reason: WASHOUT

## 2024-01-23 RX ORDER — FOLIC ACID 1 MG/1
1 TABLET ORAL DAILY
COMMUNITY
End: 2024-03-11 | Stop reason: WASHOUT

## 2024-01-23 RX ORDER — HYDROXYZINE PAMOATE 25 MG/1
25 CAPSULE ORAL EVERY 6 HOURS
COMMUNITY
End: 2024-03-11 | Stop reason: WASHOUT

## 2024-01-23 RX ORDER — ZOLPIDEM TARTRATE 5 MG/1
5 TABLET ORAL DAILY
COMMUNITY
End: 2024-03-11 | Stop reason: WASHOUT

## 2024-01-23 RX ORDER — OXYCODONE HYDROCHLORIDE 5 MG/1
5 TABLET ORAL EVERY 6 HOURS
COMMUNITY
End: 2024-03-11 | Stop reason: WASHOUT

## 2024-01-24 ENCOUNTER — APPOINTMENT (OUTPATIENT)
Dept: CARDIOLOGY | Facility: CLINIC | Age: 79
End: 2024-01-24
Payer: MEDICARE

## 2024-01-24 ENCOUNTER — OFFICE VISIT (OUTPATIENT)
Dept: CARDIOLOGY | Facility: CLINIC | Age: 79
End: 2024-01-24
Payer: MEDICARE

## 2024-01-24 VITALS
HEART RATE: 59 BPM | DIASTOLIC BLOOD PRESSURE: 76 MMHG | BODY MASS INDEX: 27.31 KG/M2 | HEIGHT: 64 IN | SYSTOLIC BLOOD PRESSURE: 128 MMHG | WEIGHT: 160 LBS

## 2024-01-24 DIAGNOSIS — I47.20 WIDE QRS VENTRICULAR TACHYCARDIA (MULTI): ICD-10-CM

## 2024-01-24 DIAGNOSIS — E78.5 HYPERLIPIDEMIA, UNSPECIFIED HYPERLIPIDEMIA TYPE: ICD-10-CM

## 2024-01-24 DIAGNOSIS — I25.10 ARTERIOSCLEROSIS OF CORONARY ARTERY: ICD-10-CM

## 2024-01-24 DIAGNOSIS — I10 ESSENTIAL HYPERTENSION: Primary | ICD-10-CM

## 2024-01-24 DIAGNOSIS — Z98.61 STATUS POST PERCUTANEOUS TRANSLUMINAL CORONARY ANGIOPLASTY: ICD-10-CM

## 2024-01-24 DIAGNOSIS — Z86.79 HISTORY OF PAROXYSMAL SUPRAVENTRICULAR TACHYCARDIA: ICD-10-CM

## 2024-01-24 DIAGNOSIS — R06.09 EXERTIONAL DYSPNEA: ICD-10-CM

## 2024-01-24 DIAGNOSIS — I35.0 AORTIC VALVE STENOSIS, ETIOLOGY OF CARDIAC VALVE DISEASE UNSPECIFIED: ICD-10-CM

## 2024-01-24 PROCEDURE — 1160F RVW MEDS BY RX/DR IN RCRD: CPT | Performed by: INTERNAL MEDICINE

## 2024-01-24 PROCEDURE — 1036F TOBACCO NON-USER: CPT | Performed by: INTERNAL MEDICINE

## 2024-01-24 PROCEDURE — 3074F SYST BP LT 130 MM HG: CPT | Performed by: INTERNAL MEDICINE

## 2024-01-24 PROCEDURE — 99215 OFFICE O/P EST HI 40 MIN: CPT | Performed by: INTERNAL MEDICINE

## 2024-01-24 PROCEDURE — 93000 ELECTROCARDIOGRAM COMPLETE: CPT | Performed by: INTERNAL MEDICINE

## 2024-01-24 PROCEDURE — 1123F ACP DISCUSS/DSCN MKR DOCD: CPT | Performed by: INTERNAL MEDICINE

## 2024-01-24 PROCEDURE — 1125F AMNT PAIN NOTED PAIN PRSNT: CPT | Performed by: INTERNAL MEDICINE

## 2024-01-24 PROCEDURE — 1157F ADVNC CARE PLAN IN RCRD: CPT | Performed by: INTERNAL MEDICINE

## 2024-01-24 PROCEDURE — 1159F MED LIST DOCD IN RCRD: CPT | Performed by: INTERNAL MEDICINE

## 2024-01-24 PROCEDURE — 3078F DIAST BP <80 MM HG: CPT | Performed by: INTERNAL MEDICINE

## 2024-01-24 RX ORDER — METOPROLOL TARTRATE 25 MG/1
12.5 TABLET, FILM COATED ORAL 2 TIMES DAILY
Qty: 90 TABLET | Refills: 1 | Status: SHIPPED | OUTPATIENT
Start: 2024-01-24 | End: 2024-07-22

## 2024-01-24 RX ORDER — ACETAMINOPHEN AND CODEINE PHOSPHATE 300; 30 MG/1; MG/1
1 TABLET ORAL AS NEEDED
COMMUNITY

## 2024-01-24 RX ORDER — ATORVASTATIN CALCIUM 20 MG/1
20 TABLET, FILM COATED ORAL NIGHTLY
Qty: 90 TABLET | Refills: 1 | Status: SHIPPED | OUTPATIENT
Start: 2024-01-24 | End: 2024-07-22

## 2024-01-24 RX ORDER — LANOLIN ALCOHOL/MO/W.PET/CERES
1000 CREAM (GRAM) TOPICAL DAILY
COMMUNITY
End: 2024-03-11 | Stop reason: WASHOUT

## 2024-01-24 RX ORDER — LOSARTAN POTASSIUM 50 MG/1
50 TABLET ORAL 2 TIMES DAILY
Qty: 180 TABLET | Refills: 1 | Status: SHIPPED | OUTPATIENT
Start: 2024-01-24 | End: 2024-07-22

## 2024-01-24 ASSESSMENT — ENCOUNTER SYMPTOMS
DEPRESSION: 0
OCCASIONAL FEELINGS OF UNSTEADINESS: 0
LOSS OF SENSATION IN FEET: 0

## 2024-01-24 NOTE — PROGRESS NOTES
"Chief Complaint:   Annual Exam (Yearly check up)     History Of Present Illness:    Nat Thacker is a 78 y.o. female  with history of mild aortic valve stenosis, PSVT, post PCI to left circumflex, hypertension who is here for follow up.      No history of chest pain, orthopnea, PND, ankle swelling, palpitations, lightheadedness or loss of consciousness.  She is saying that she is extremely short of breath with activities and tired for past several months.  This is new for her.      In [id number] she had a prolonged hospitalization because of intra-abdominal abscess formation, enterocutaneous fistula formation and was on TPN long-term. During the hospital stay she was found to have paroxysmal SVT and one episode of slow wide-complex tachycardia thought to be idioventricular rhythm or SVT with aberration. Echo showed preserved LV function. She was started on the beta blockers. We performed a stress test (2020) which was abnormal and this led to cardiac catheterization. She underwent stent placement to left circumflex in February 2020.      We did a 7 a monitor that did not show any WCT but one episode of paroxysmal SVT for 36 beats.     EKG today shows normal sinus rhythm.     Echo done on February 12, 2019 and 1/29/20 showed ejection fraction of 65% with mild aortic valve stenosis.  From 2024 shows normal LVEF mild to moderate aortic valve stenosis.      .     Last Recorded Vitals:  Vitals:    01/24/24 1216   BP: 128/76   BP Location: Right arm   Pulse: 59   Weight: 72.6 kg (160 lb)   Height: 1.626 m (5' 4\")       Past Medical History:  She has a past medical history of Abnormal result of other cardiovascular function study (02/14/2020), Colostomy status (CMS/[facility]) (11/23/2020), Encounter for other preprocedural examination (08/20/2020), Encounter for preprocedural cardiovascular examination (11/19/2020), Fibrosclerosis of unspecified breast, Fistula of intestine (01/08/2021), Nausea (10/05/2020), Other long term " (current) drug therapy (07/16/2020), Other symptoms and signs involving the genitourinary system (10/09/2020), Personal history of diseases of the skin and subcutaneous tissue (06/30/2021), Personal history of other diseases of the circulatory system (08/13/2019), Personal history of other diseases of the digestive system, Personal history of other diseases of the digestive system (12/16/2020), Personal history of other diseases of the digestive system (12/15/2020), Personal history of other infectious and parasitic diseases (06/30/2021), Personal history of other malignant neoplasm of skin, Personal history of urinary (tract) infections (10/13/2020), and Unspecified abdominal pain (10/05/2020).    Past Surgical History:  She has a past surgical history that includes Other surgical history (02/14/2019); Other surgical history (02/14/2019); Other surgical history (02/14/2019); Other surgical history (08/13/2019); Other surgical history (08/13/2019); and Other surgical history (02/19/2020).      Social History:  She reports that she has quit smoking. Her smoking use included cigarettes. She has never been exposed to tobacco smoke. She has never used smokeless tobacco. She reports current alcohol use. She reports that she does not use drugs.    Family History:  Family History   Problem Relation Name Age of Onset    Kidney disease Father      Heart disease Father      Hypertension Other          Allergies:  Adhesive tape-silicones, Ciprofloxacin, Latex, Lisinopril, Naproxen, and Sulfa (sulfonamide antibiotics)    Outpatient Medications:  Current Outpatient Medications   Medication Instructions    acetaminophen-codeine (Tylenol w/ Codeine #3) 300-30 mg tablet 1 tablet, oral, As needed    aspirin 81 mg EC tablet oral    atorvastatin (Lipitor) 20 mg tablet 1 tablet, oral, Nightly    biotin 1 mg, oral, Daily RT    cyanocobalamin (VITAMIN B-12) 1,000 mcg, oral, Daily    folic acid (FOLVITE) 1 mg, oral, Daily    hydrALAZINE  (APRESOLINE) 50 mg, oral, 2 times daily    hydrOXYzine pamoate (VISTARIL) 25 mg, oral, Every 6 hours    losartan (Cozaar) 50 mg tablet 1 tablet, oral, 2 times daily    metoprolol tartrate (Lopressor) 25 mg tablet 0.5 tablets, oral, 2 times daily    multivitamin tablet 1 tablet, oral, Daily    omega-3 acid ethyl esters (Lovaza) 1 gram capsule 1 capsule, oral, Daily RT    ondansetron (ZOFRAN) 8 mg, oral, Every 8 hours PRN    oxyCODONE (ROXICODONE) 5 mg, oral, Every 6 hours    potassium chloride CR (K-Tab) 20 mEq ER tablet 20 mEq, oral, Daily    promethazine (PHENERGAN) 25 mg, oral, Every 8 hours    valACYclovir (VALTREX) 1,000 mg, oral, Every 8 hours    zolpidem (AMBIEN) 5 mg, oral, Daily       Physical Exam:  Physical Exam  Vitals reviewed.   Constitutional:       Appearance: Normal appearance.   Neck:      Vascular: No carotid bruit or JVD.   Cardiovascular:      Rate and Rhythm: Normal rate and regular rhythm.      Pulses: Normal pulses.      Heart sounds: S1 normal and S2 normal. Murmur heard.      Systolic murmur is present with a grade of 2/6.      Comments: systolic murmur radiating to both carotids.  Pulmonary:      Effort: Pulmonary effort is normal. No respiratory distress.      Breath sounds: No wheezing or rales.   Abdominal:      General: Abdomen is flat.      Palpations: Abdomen is soft.   Musculoskeletal:      Right lower leg: No edema.      Left lower leg: No edema.   Skin:     General: Skin is warm.   Neurological:      Mental Status: She is alert and oriented to person, place, and time. Mental status is at baseline.   Psychiatric:         Mood and Affect: Mood normal.         Behavior: Behavior normal.           Last Labs:  CBC -  Lab Results   Component Value Date    WBC 7.4 12/06/2023    HGB 12.5 12/06/2023    HCT 39.9 12/06/2023    MCV 92 12/06/2023     12/06/2023       CMP -  Lab Results   Component Value Date    CALCIUM 8.7 12/29/2023    PHOS 5.2 (H) 09/10/2020    PROT 6.5 12/06/2023     ALBUMIN 4.0 12/06/2023    AST 22 12/06/2023    ALT 23 12/06/2023    ALKPHOS 72 12/06/2023    BILITOT 0.4 12/06/2023       LIPID PANEL -   Lab Results   Component Value Date    CHOL 162 12/06/2023    TRIG 124 12/06/2023    HDL 66.8 12/06/2023    CHHDL 2.4 12/06/2023    LDLF 79 01/27/2023    VLDL 25 12/06/2023    NHDL 95 12/06/2023       RENAL FUNCTION PANEL -   Lab Results   Component Value Date    GLUCOSE 83 12/29/2023     12/29/2023    K 4.3 12/29/2023     12/29/2023    CO2 28 12/29/2023    ANIONGAP 10 12/29/2023    BUN 19 12/29/2023    CREATININE 1.06 (H) 12/29/2023    CALCIUM 8.7 12/29/2023    PHOS 5.2 (H) 09/10/2020    ALBUMIN 4.0 12/06/2023        Lab Results   Component Value Date    HGBA1C 5.9 (H) 12/29/2023       Last Cardiology Tests:  ECG:  No results found for this or any previous visit from the past 1095 days.      Echo:  Transthoracic Echo (TTE) Complete 01/15/2024      Ejection Fractions:  EF   Date/Time Value Ref Range Status   01/15/2024 09:00 AM 72         Cath:  No results found for this or any previous visit from the past 1095 days.      Stress Test:  No results found for this or any previous visit from the past 1095 days.      Cardiac Imaging:  No results found for this or any previous visit from the past 1095 days.          Assessment/Plan   In summary Ms Thacker is a 78-year-old lady with mild-mod aortic valve stenosis, PSVT.      1- She has mild-mod aortic valve stenosis and is asymptomatic. Monitor with periodic echocardiogram. We will get 1 in 2024.     2-PSVT- Patient is asymptomatic. On beta blockers.      3-CAD- S/P PCI to LCX (Feb 2020) . Continue statins aspirin and beta-blockers.  Lipid profile is favorable.    4-hypertension-she showed me her home blood pressure log, most days blood pressure is well-controlled advised to continue monitoring blood pressure at home continue current management.  Target blood pressure less than 130/80.    5-exertional dyspnea-differential  diagnosis deconditioning due to arthritis versus cardiac.  We will arrange for an exercise stress test with MPI given prior history of stenting/known coronary artery disease.    Olivia Santos MD

## 2024-02-19 ENCOUNTER — TELEPHONE (OUTPATIENT)
Dept: CARDIOLOGY | Facility: CLINIC | Age: 79
End: 2024-02-19
Payer: MEDICARE

## 2024-02-19 NOTE — TELEPHONE ENCOUNTER
She left a voicemail saying she had questions about her upcomming testing.  She is scheduled for a stress test this week.  Called her back.  She wanted to know about taking medications prior to the stress test.  She is scheduled for an exercise MPI.  Hold metoprolol that morning and no caffeine or chocolate, not even decaf tea or coffee because they contain a small mount of caffeine.  She verbalized understanding.

## 2024-02-21 ENCOUNTER — HOSPITAL ENCOUNTER (OUTPATIENT)
Dept: RADIOLOGY | Facility: HOSPITAL | Age: 79
Discharge: HOME | End: 2024-02-21
Payer: MEDICARE

## 2024-02-21 ENCOUNTER — HOSPITAL ENCOUNTER (OUTPATIENT)
Dept: CARDIOLOGY | Facility: HOSPITAL | Age: 79
Discharge: HOME | End: 2024-02-21
Payer: MEDICARE

## 2024-02-21 ENCOUNTER — TELEPHONE (OUTPATIENT)
Dept: CARDIOLOGY | Facility: CLINIC | Age: 79
End: 2024-02-21

## 2024-02-21 DIAGNOSIS — I25.10 ARTERIOSCLEROSIS OF CORONARY ARTERY: ICD-10-CM

## 2024-02-21 DIAGNOSIS — R06.09 EXERTIONAL DYSPNEA: ICD-10-CM

## 2024-02-21 PROCEDURE — 93016 CV STRESS TEST SUPVJ ONLY: CPT | Performed by: INTERNAL MEDICINE

## 2024-02-21 PROCEDURE — 2500000004 HC RX 250 GENERAL PHARMACY W/ HCPCS (ALT 636 FOR OP/ED): Performed by: NURSE PRACTITIONER

## 2024-02-21 PROCEDURE — 93017 CV STRESS TEST TRACING ONLY: CPT

## 2024-02-21 PROCEDURE — 78452 HT MUSCLE IMAGE SPECT MULT: CPT | Performed by: INTERNAL MEDICINE

## 2024-02-21 PROCEDURE — A9502 TC99M TETROFOSMIN: HCPCS | Performed by: INTERNAL MEDICINE

## 2024-02-21 PROCEDURE — 3430000001 HC RX 343 DIAGNOSTIC RADIOPHARMACEUTICALS: Performed by: INTERNAL MEDICINE

## 2024-02-21 RX ORDER — HYDRALAZINE HYDROCHLORIDE 20 MG/ML
INJECTION INTRAMUSCULAR; INTRAVENOUS
Status: DISCONTINUED
Start: 2024-02-21 | End: 2024-02-21 | Stop reason: HOSPADM

## 2024-02-21 RX ORDER — HYDRALAZINE HYDROCHLORIDE 20 MG/ML
10 INJECTION INTRAMUSCULAR; INTRAVENOUS ONCE
Status: COMPLETED | OUTPATIENT
Start: 2024-02-21 | End: 2024-02-21

## 2024-02-21 RX ADMIN — TETROFOSMIN 30 MILLICURIE: 0.23 INJECTION, POWDER, LYOPHILIZED, FOR SOLUTION INTRAVENOUS at 10:25

## 2024-02-21 RX ADMIN — HYDRALAZINE HYDROCHLORIDE 10 MG: 20 INJECTION, SOLUTION INTRAMUSCULAR; INTRAVENOUS at 10:06

## 2024-02-21 RX ADMIN — TETROFOSMIN 10 MILLICURIE: 0.23 INJECTION, POWDER, LYOPHILIZED, FOR SOLUTION INTRAVENOUS at 08:05

## 2024-02-21 NOTE — TELEPHONE ENCOUNTER
Called her back.  The full result is not back.  Waiting on the nuclear portion to be read and reviewed by Dr. Santos.  Will call her when complete result is back and there is a plan.  She verbalized understanding.

## 2024-02-21 NOTE — TELEPHONE ENCOUNTER
----- Message from Karen Esquivel sent at 2/21/2024  4:12 PM EST -----  Regarding: Stress Test.  Patient  wouldlike to talk about her stress test that was done today 02/21/2024.  Apparently there were some complications and she'd like someone to call her at [phone] or  [phone].  Thank you.

## 2024-02-21 NOTE — RESULT ENCOUNTER NOTE
Your stress test was normal or low risk.  Generally speaking that means that the risk of heart attack is low.  We generally resort to controlling heart disease risk factors aggressively in this situation to reduce future risk of heart attack.  Commonly known heart disease risk factors are tobacco use if any, high cholesterol, high blood pressure, uncontrolled diabetes, lack of physical activities, poor diet, excessive body weight.  If you have any of these, please try your best to modify.    As always, the risk of heart attack is never zero.  If you ever experience chest pain at rest lasting more than 15 minutes please promptly call 911 and going to the emergency room for urgent evaluation.  If you have stable angina (chest pain with activities, that resolves with rest), then we can add some medications and refer you to cardiac rehab to improve symptoms.  However despite these if you continue to have worsening symptoms please reach out to us urgently or call 911 if you are having rest chest pain lasting longer than 15 minutes.

## 2024-02-22 NOTE — TELEPHONE ENCOUNTER
----- Message from Olivia Santos MD sent at 2/22/2024  5:43 PM EST -----  Regarding: RE: Stress test/plan  Fu with our JYOTI with daily home BP log in 2 -3 weeks  ----- Message -----  From: Nancy Cook RN  Sent: 2/22/2024   5:32 PM EST  To: Olivia Santos MD  Subject: Stress test/plan                                 Although her stress test was low risk, no evidence of ischemia.  Her blood pressure was very elevated during the test and she had multiple doses of IV medications to get it down.  Stress test did produce her symptoms.      She tells me her blood pressure is at goal at home but those readings are not after/during any exertion.  She just suddenly started having symptoms about 2 months ago.  She is concerned about not having a follow up visit until June.  Please advise.

## 2024-02-22 NOTE — TELEPHONE ENCOUNTER
----- Message from Olivia Santos MD sent at 2/21/2024  5:08 PM EST -----  Your stress test was normal or low risk.  Generally speaking that means that the risk of heart attack is low.  We generally resort to controlling heart disease risk factors aggressively in this situation to reduce future risk of heart attack.  Commonly known heart disease risk factors are tobacco use if any, high cholesterol, high blood pressure, uncontrolled diabetes, lack of physical activities, poor diet, excessive body weight.  If you have any of these, please try your best to modify.    As always, the risk of heart attack is never zero.  If you ever experience chest pain at rest lasting more than 15 minutes please promptly call 911 and going to the emergency room for urgent evaluation.  If you have stable angina (chest pain with activities, that resolves with rest), then we can add some medications and refer you to cardiac rehab to improve symptoms.  However despite these if you continue to have worsening symptoms please reach out to us urgently or call 911 if you are having rest chest pain lasting longer than 15 minutes.

## 2024-02-22 NOTE — TELEPHONE ENCOUNTER
Called her with Dr. Santos's recommendations and got her scheduled with Joseluis Sharpe for follow up.  She will log blood pressures and heart rates.

## 2024-02-22 NOTE — TELEPHONE ENCOUNTER
Called her with her stress results and read her Dr. Santos's note on her result.      I had asked her to get some blood pressure readings with heart rates after exertion/when she is having symptoms.       She would like to either have an appointment or be able to discuss before her currently scheduled follow up.  Message to Dr. Santos for review/recommendations.

## 2024-03-11 ENCOUNTER — OFFICE VISIT (OUTPATIENT)
Dept: CARDIOLOGY | Facility: CLINIC | Age: 79
End: 2024-03-11
Payer: MEDICARE

## 2024-03-11 VITALS
SYSTOLIC BLOOD PRESSURE: 180 MMHG | HEART RATE: 64 BPM | BODY MASS INDEX: 27.12 KG/M2 | DIASTOLIC BLOOD PRESSURE: 60 MMHG | WEIGHT: 158 LBS

## 2024-03-11 DIAGNOSIS — I10 ESSENTIAL HYPERTENSION: ICD-10-CM

## 2024-03-11 DIAGNOSIS — I35.0 AORTIC VALVE STENOSIS, ETIOLOGY OF CARDIAC VALVE DISEASE UNSPECIFIED: Primary | ICD-10-CM

## 2024-03-11 DIAGNOSIS — R09.89 BILATERAL CAROTID BRUITS: ICD-10-CM

## 2024-03-11 DIAGNOSIS — R94.39 ABNORMAL STRESS TEST: ICD-10-CM

## 2024-03-11 DIAGNOSIS — R00.0 TACHYCARDIA: ICD-10-CM

## 2024-03-11 PROCEDURE — 1036F TOBACCO NON-USER: CPT | Performed by: PHYSICIAN ASSISTANT

## 2024-03-11 PROCEDURE — 1125F AMNT PAIN NOTED PAIN PRSNT: CPT | Performed by: PHYSICIAN ASSISTANT

## 2024-03-11 PROCEDURE — 3078F DIAST BP <80 MM HG: CPT | Performed by: PHYSICIAN ASSISTANT

## 2024-03-11 PROCEDURE — 99215 OFFICE O/P EST HI 40 MIN: CPT | Performed by: PHYSICIAN ASSISTANT

## 2024-03-11 PROCEDURE — 1157F ADVNC CARE PLAN IN RCRD: CPT | Performed by: PHYSICIAN ASSISTANT

## 2024-03-11 PROCEDURE — 3077F SYST BP >= 140 MM HG: CPT | Performed by: PHYSICIAN ASSISTANT

## 2024-03-11 PROCEDURE — 1160F RVW MEDS BY RX/DR IN RCRD: CPT | Performed by: PHYSICIAN ASSISTANT

## 2024-03-11 PROCEDURE — 1123F ACP DISCUSS/DSCN MKR DOCD: CPT | Performed by: PHYSICIAN ASSISTANT

## 2024-03-11 PROCEDURE — 1159F MED LIST DOCD IN RCRD: CPT | Performed by: PHYSICIAN ASSISTANT

## 2024-03-11 RX ORDER — HYDROCHLOROTHIAZIDE 12.5 MG/1
12.5 CAPSULE ORAL EVERY MORNING
Qty: 30 CAPSULE | Refills: 1 | Status: SHIPPED | OUTPATIENT
Start: 2024-03-11 | End: 2024-04-11 | Stop reason: SDUPTHER

## 2024-03-11 ASSESSMENT — ENCOUNTER SYMPTOMS
WHEEZING: 0
ABDOMINAL PAIN: 0
PALPITATIONS: 0
DIARRHEA: 0
DYSPNEA ON EXERTION: 1
SHORTNESS OF BREATH: 1
NAUSEA: 0
ORTHOPNEA: 0
DYSURIA: 0
WEAKNESS: 0
FEVER: 0
VOMITING: 0

## 2024-03-11 NOTE — PROGRESS NOTES
Cardiology Follow Up  Chief Complaint:   Here for follow up of elevated BP.       History Of Present Illness:    Nat Thacker is a 78 y.o. female  with history of mild aortic valve stenosis, PSVT, post PCI to left circumflex, hypertension who is here for follow up.    No history of chest pain, orthopnea, PND, ankle swelling, palpitations, lightheadedness or loss of consciousness.  She is saying that she is extremely short of breath with activities and tired for past several months.  This is new for her.    In [id number] she had a prolonged hospitalization because of intra-abdominal abscess formation, enterocutaneous fistula formation and was on TPN long-term. During the hospital stay she was found to have paroxysmal SVT and one episode of slow wide-complex tachycardia thought to be idioventricular rhythm or SVT with aberration. Echo showed preserved LV function. She was started on the beta blockers. We performed a stress test (2020) which was abnormal and this led to cardiac catheterization. She underwent stent placement to left circumflex in February 2020.    We did a 7 a monitor that did not show any WCT but one episode of paroxysmal SVT for 36 beats.   EKG today shows normal sinus rhythm.   Echo done on February 12, 2019 and 1/29/20 showed ejection fraction of 65% with mild aortic valve stenosis.  From 2024 shows normal LVEF mild to moderate aortic valve stenosis.  3-11-24: This a very pleasant 79-year-old female patient known to Dr. Santos.  She presents today for follow-up after recent abnormal stress test.  Patient was seen for complaints of fatigue and shortness of breath.  Recently has had markedly elevated blood pressures and with the elevated blood pressures has had fatigue and shortness of breath.  She noted this back in December when she would go to [employer] basketball games and upon going up the stairs to the upper deck she would have to stop because she could not breathe and felt very fatigued.   Recently had echocardiogram showing normal LV systolic function with some mild degree of aortic stenosis.  With her stress test the patient had marked hypertension with systolic blood pressures up to 228.  The treadmill portion did note to have some lateral ST-T wave changes.  Interestingly in discussing her symptoms she states that this same issue occurred when she needed PCI to the  back in 2020.  At that time she started noticing more of an abrupt onset of shortness of breath and fatigue with very erratic and high blood pressures.  Since the PCI she states that her blood pressures normally run in a 1 20-1 40 range.  Now seeing blood pressures 170 and higher at times.        Last Recorded Vitals:  Vitals:    03/11/24 1427 03/11/24 1503   BP: (!) 192/70 180/60   Pulse: 64    Weight: 71.7 kg (158 lb)        Past Medical History:  She has a past medical history of Abnormal result of other cardiovascular function study (02/14/2020), Colostomy status (CMS/[facility]) (11/23/2020), Encounter for other preprocedural examination (08/20/2020), Encounter for preprocedural cardiovascular examination (11/19/2020), Fibrosclerosis of unspecified breast, Fistula of intestine (01/08/2021), Nausea (10/05/2020), Other long term (current) drug therapy (07/16/2020), Other symptoms and signs involving the genitourinary system (10/09/2020), Personal history of diseases of the skin and subcutaneous tissue (06/30/2021), Personal history of other diseases of the circulatory system (08/13/2019), Personal history of other diseases of the digestive system, Personal history of other diseases of the digestive system (12/16/2020), Personal history of other diseases of the digestive system (12/15/2020), Personal history of other infectious and parasitic diseases (06/30/2021), Personal history of other malignant neoplasm of skin, Personal history of urinary (tract) infections (10/13/2020), and Unspecified abdominal pain (10/05/2020).    Past Surgical  History:  She has a past surgical history that includes Other surgical history (02/14/2019); Other surgical history (02/14/2019); Other surgical history (02/14/2019); Other surgical history (08/13/2019); Other surgical history (08/13/2019); and Other surgical history (02/19/2020).      Social History:  She reports that she has quit smoking. Her smoking use included cigarettes. She has never been exposed to tobacco smoke. She has never used smokeless tobacco. She reports current alcohol use. She reports that she does not use drugs.    Family History:  Family History   Problem Relation Name Age of Onset    Kidney disease Father      Heart disease Father      Hypertension Other          Allergies:  Adhesive tape-silicones, Ciprofloxacin, Latex, Lisinopril, Naproxen, and Sulfa (sulfonamide antibiotics)    Outpatient Medications:  Current Outpatient Medications   Medication Instructions    acetaminophen-codeine (Tylenol w/ Codeine #3) 300-30 mg tablet 1 tablet, oral, As needed    aspirin 81 mg EC tablet oral    atorvastatin (LIPITOR) 20 mg, oral, Nightly    hydroCHLOROthiazide (MICROZIDE) 12.5 mg, oral, Every morning    losartan (COZAAR) 50 mg, oral, 2 times daily    metoprolol tartrate (LOPRESSOR) 12.5 mg, oral, 2 times daily    multivitamin tablet 1 tablet, oral, Daily     Review of Systems   Constitutional: Negative for fever and malaise/fatigue.        Fatigues quite easily   Cardiovascular:  Positive for dyspnea on exertion. Negative for chest pain, orthopnea and palpitations.   Respiratory:  Positive for shortness of breath. Negative for wheezing.    Skin:  Negative for itching and rash.   Gastrointestinal:  Negative for abdominal pain, diarrhea, nausea and vomiting.        On recent CT scan of the abdomen was noted to have calcifications through the aorta iliacs down into the femorals.   Genitourinary:  Negative for dysuria.   Neurological:  Negative for weakness.      Physical Exam  Constitutional:       General:  She is not in acute distress.     Appearance: Normal appearance.   HENT:      Mouth/Throat:      Mouth: Mucous membranes are moist.   Neck:      Comments: Bilateral carotid bruits  Cardiovascular:      Rate and Rhythm: Normal rate and regular rhythm.      Heart sounds: Murmur (Systolic murmur noted at the base) heard.   Pulmonary:      Effort: Pulmonary effort is normal.      Breath sounds: Normal breath sounds.   Abdominal:      General: Bowel sounds are normal.      Palpations: Abdomen is soft.      Tenderness: There is no abdominal tenderness.   Musculoskeletal:         General: No swelling.      Right lower leg: No edema.      Left lower leg: No edema.   Skin:     General: Skin is warm and dry.   Neurological:      Mental Status: She is alert and oriented to person, place, and time.   Psychiatric:         Behavior: Behavior is cooperative.           Last Labs:  CBC -  Lab Results   Component Value Date    WBC 7.4 12/06/2023    HGB 12.5 12/06/2023    HCT 39.9 12/06/2023    MCV 92 12/06/2023     12/06/2023       CMP -  Lab Results   Component Value Date    CALCIUM 8.7 12/29/2023    PHOS 5.2 (H) 09/10/2020    PROT 6.5 12/06/2023    ALBUMIN 4.0 12/06/2023    AST 22 12/06/2023    ALT 23 12/06/2023    ALKPHOS 72 12/06/2023    BILITOT 0.4 12/06/2023       LIPID PANEL -   Lab Results   Component Value Date    CHOL 162 12/06/2023    TRIG 124 12/06/2023    HDL 66.8 12/06/2023    CHHDL 2.4 12/06/2023    LDLF 79 01/27/2023    VLDL 25 12/06/2023    NHDL 95 12/06/2023       RENAL FUNCTION PANEL -   Lab Results   Component Value Date    GLUCOSE 83 12/29/2023     12/29/2023    K 4.3 12/29/2023     12/29/2023    CO2 28 12/29/2023    ANIONGAP 10 12/29/2023    BUN 19 12/29/2023    CREATININE 1.06 (H) 12/29/2023    CALCIUM 8.7 12/29/2023    PHOS 5.2 (H) 09/10/2020    ALBUMIN 4.0 12/06/2023        Lab Results   Component Value Date    HGBA1C 5.9 (H) 12/29/2023       Last Cardiology Tests:    Echo:  Transthoracic  Echo (TTE) Complete 01/15/2024--CONCLUSIONS:   1. Left ventricular systolic function is normal with a 60% estimated ejection fraction.   2. Mild to moderate aortic valve stenosis.     Stress Test:  Nuclear Stress Test 02/21/2024--IMPRESSION:  Normal exercise stress myocardial perfusion imaging at level of  stress achieved. Poor exercise tolerance..      Lab review: I have personally reviewed the laboratory result(s).    Assessment/Plan   Problem List Items Addressed This Visit             ICD-10-CM       Cardiac and Vasculature    Aortic valve stenosis - Primary I35.0    Essential hypertension I10    Relevant Medications    hydroCHLOROthiazide (Microzide) 12.5 mg capsule    Other Relevant Orders    Vascular US renal artery duplex complete    Case Request Cath Lab: Left And Right Heart Cath, Without LV (Completed)    Basic metabolic panel    Tachycardia R00.0     Other Visit Diagnoses         Codes    Abnormal stress test     R94.39    Relevant Orders    Case Request Cath Lab: Left And Right Heart Cath, Without LV (Completed)    CBC and Auto Differential    Bilateral carotid bruits     R09.89    Relevant Orders    Carotid duplex bilateral        Abnormal stress testing/marked hypertension--will add low-dose hydrochlorothiazide for little bit better blood pressure control.  Her creatinine and electrolytes previously been normal.  With the notation of abdominal atherosclerosis seen on CT scan will check renal arterial duplex.  She did have some EKG changes on the treadmill portion of the stress test but no nuclear changes.  Considering that this same event occurred when she needed PCI to the OM we will arrange for heart catheterization with Dr. Anderson.  She will otherwise continue her beta-blocker and losartan.  I do not feel that we can increase her beta-blocker as she has bradycardia at times with heart rates in the low 50s.  Carotid bruits--considering that she had the abdominal atherosclerosis and has PCI to the OM  possible she could have carotid disease as well.  Will check carotid arterial duplex.  Hypertension see #1.  Add low-dose hydrochlorothiazide.  Labs in 1 week to check electrolytes and renal function renal arterial duplex also ordered..  Aortic stenosis--continue to monitor with serial echocardiography.  Peak gradient noted on this most recent echocardiogram was 27 mmHg with a mean of 13.1..  History of tachycardia/SVT--with a dramatic change in her functional status with marked elevation in blood pressure she has not noted any episodes of tachycardia.  Overall patient's symptoms are quite concerning.  For now continue current medical therapy as lipids have been relatively well-controlled.  Again plan will be for carotid arterial duplex, renal arterial duplex, left heart catheterization and add hydrochlorothiazide for better blood pressure management.  Labs in 1 week's time.  Will review with Dr. Santos.      Heath Sharpe PA-C  3/11/2024  3:03 PM

## 2024-03-11 NOTE — H&P (VIEW-ONLY)
Cardiology Follow Up  Chief Complaint:   Here for follow up of elevated BP.       History Of Present Illness:    Nat Thacker is a 78 y.o. female  with history of mild aortic valve stenosis, PSVT, post PCI to left circumflex, hypertension who is here for follow up.    No history of chest pain, orthopnea, PND, ankle swelling, palpitations, lightheadedness or loss of consciousness.  She is saying that she is extremely short of breath with activities and tired for past several months.  This is new for her.    In [id number] she had a prolonged hospitalization because of intra-abdominal abscess formation, enterocutaneous fistula formation and was on TPN long-term. During the hospital stay she was found to have paroxysmal SVT and one episode of slow wide-complex tachycardia thought to be idioventricular rhythm or SVT with aberration. Echo showed preserved LV function. She was started on the beta blockers. We performed a stress test (2020) which was abnormal and this led to cardiac catheterization. She underwent stent placement to left circumflex in February 2020.    We did a 7 a monitor that did not show any WCT but one episode of paroxysmal SVT for 36 beats.   EKG today shows normal sinus rhythm.   Echo done on February 12, 2019 and 1/29/20 showed ejection fraction of 65% with mild aortic valve stenosis.  From 2024 shows normal LVEF mild to moderate aortic valve stenosis.  3-11-24: This a very pleasant 79-year-old female patient known to Dr. Santos.  She presents today for follow-up after recent abnormal stress test.  Patient was seen for complaints of fatigue and shortness of breath.  Recently has had markedly elevated blood pressures and with the elevated blood pressures has had fatigue and shortness of breath.  She noted this back in December when she would go to [employer] basketball games and upon going up the stairs to the upper deck she would have to stop because she could not breathe and felt very fatigued.   Recently had echocardiogram showing normal LV systolic function with some mild degree of aortic stenosis.  With her stress test the patient had marked hypertension with systolic blood pressures up to 228.  The treadmill portion did note to have some lateral ST-T wave changes.  Interestingly in discussing her symptoms she states that this same issue occurred when she needed PCI to the  back in 2020.  At that time she started noticing more of an abrupt onset of shortness of breath and fatigue with very erratic and high blood pressures.  Since the PCI she states that her blood pressures normally run in a 1 20-1 40 range.  Now seeing blood pressures 170 and higher at times.        Last Recorded Vitals:  Vitals:    03/11/24 1427 03/11/24 1503   BP: (!) 192/70 180/60   Pulse: 64    Weight: 71.7 kg (158 lb)        Past Medical History:  She has a past medical history of Abnormal result of other cardiovascular function study (02/14/2020), Colostomy status (CMS/[facility]) (11/23/2020), Encounter for other preprocedural examination (08/20/2020), Encounter for preprocedural cardiovascular examination (11/19/2020), Fibrosclerosis of unspecified breast, Fistula of intestine (01/08/2021), Nausea (10/05/2020), Other long term (current) drug therapy (07/16/2020), Other symptoms and signs involving the genitourinary system (10/09/2020), Personal history of diseases of the skin and subcutaneous tissue (06/30/2021), Personal history of other diseases of the circulatory system (08/13/2019), Personal history of other diseases of the digestive system, Personal history of other diseases of the digestive system (12/16/2020), Personal history of other diseases of the digestive system (12/15/2020), Personal history of other infectious and parasitic diseases (06/30/2021), Personal history of other malignant neoplasm of skin, Personal history of urinary (tract) infections (10/13/2020), and Unspecified abdominal pain (10/05/2020).    Past Surgical  History:  She has a past surgical history that includes Other surgical history (02/14/2019); Other surgical history (02/14/2019); Other surgical history (02/14/2019); Other surgical history (08/13/2019); Other surgical history (08/13/2019); and Other surgical history (02/19/2020).      Social History:  She reports that she has quit smoking. Her smoking use included cigarettes. She has never been exposed to tobacco smoke. She has never used smokeless tobacco. She reports current alcohol use. She reports that she does not use drugs.    Family History:  Family History   Problem Relation Name Age of Onset    Kidney disease Father      Heart disease Father      Hypertension Other          Allergies:  Adhesive tape-silicones, Ciprofloxacin, Latex, Lisinopril, Naproxen, and Sulfa (sulfonamide antibiotics)    Outpatient Medications:  Current Outpatient Medications   Medication Instructions    acetaminophen-codeine (Tylenol w/ Codeine #3) 300-30 mg tablet 1 tablet, oral, As needed    aspirin 81 mg EC tablet oral    atorvastatin (LIPITOR) 20 mg, oral, Nightly    hydroCHLOROthiazide (MICROZIDE) 12.5 mg, oral, Every morning    losartan (COZAAR) 50 mg, oral, 2 times daily    metoprolol tartrate (LOPRESSOR) 12.5 mg, oral, 2 times daily    multivitamin tablet 1 tablet, oral, Daily     Review of Systems   Constitutional: Negative for fever and malaise/fatigue.        Fatigues quite easily   Cardiovascular:  Positive for dyspnea on exertion. Negative for chest pain, orthopnea and palpitations.   Respiratory:  Positive for shortness of breath. Negative for wheezing.    Skin:  Negative for itching and rash.   Gastrointestinal:  Negative for abdominal pain, diarrhea, nausea and vomiting.        On recent CT scan of the abdomen was noted to have calcifications through the aorta iliacs down into the femorals.   Genitourinary:  Negative for dysuria.   Neurological:  Negative for weakness.      Physical Exam  Constitutional:       General:  She is not in acute distress.     Appearance: Normal appearance.   HENT:      Mouth/Throat:      Mouth: Mucous membranes are moist.   Neck:      Comments: Bilateral carotid bruits  Cardiovascular:      Rate and Rhythm: Normal rate and regular rhythm.      Heart sounds: Murmur (Systolic murmur noted at the base) heard.   Pulmonary:      Effort: Pulmonary effort is normal.      Breath sounds: Normal breath sounds.   Abdominal:      General: Bowel sounds are normal.      Palpations: Abdomen is soft.      Tenderness: There is no abdominal tenderness.   Musculoskeletal:         General: No swelling.      Right lower leg: No edema.      Left lower leg: No edema.   Skin:     General: Skin is warm and dry.   Neurological:      Mental Status: She is alert and oriented to person, place, and time.   Psychiatric:         Behavior: Behavior is cooperative.           Last Labs:  CBC -  Lab Results   Component Value Date    WBC 7.4 12/06/2023    HGB 12.5 12/06/2023    HCT 39.9 12/06/2023    MCV 92 12/06/2023     12/06/2023       CMP -  Lab Results   Component Value Date    CALCIUM 8.7 12/29/2023    PHOS 5.2 (H) 09/10/2020    PROT 6.5 12/06/2023    ALBUMIN 4.0 12/06/2023    AST 22 12/06/2023    ALT 23 12/06/2023    ALKPHOS 72 12/06/2023    BILITOT 0.4 12/06/2023       LIPID PANEL -   Lab Results   Component Value Date    CHOL 162 12/06/2023    TRIG 124 12/06/2023    HDL 66.8 12/06/2023    CHHDL 2.4 12/06/2023    LDLF 79 01/27/2023    VLDL 25 12/06/2023    NHDL 95 12/06/2023       RENAL FUNCTION PANEL -   Lab Results   Component Value Date    GLUCOSE 83 12/29/2023     12/29/2023    K 4.3 12/29/2023     12/29/2023    CO2 28 12/29/2023    ANIONGAP 10 12/29/2023    BUN 19 12/29/2023    CREATININE 1.06 (H) 12/29/2023    CALCIUM 8.7 12/29/2023    PHOS 5.2 (H) 09/10/2020    ALBUMIN 4.0 12/06/2023        Lab Results   Component Value Date    HGBA1C 5.9 (H) 12/29/2023       Last Cardiology Tests:    Echo:  Transthoracic  Echo (TTE) Complete 01/15/2024--CONCLUSIONS:   1. Left ventricular systolic function is normal with a 60% estimated ejection fraction.   2. Mild to moderate aortic valve stenosis.     Stress Test:  Nuclear Stress Test 02/21/2024--IMPRESSION:  Normal exercise stress myocardial perfusion imaging at level of  stress achieved. Poor exercise tolerance..      Lab review: I have personally reviewed the laboratory result(s).    Assessment/Plan   Problem List Items Addressed This Visit             ICD-10-CM       Cardiac and Vasculature    Aortic valve stenosis - Primary I35.0    Essential hypertension I10    Relevant Medications    hydroCHLOROthiazide (Microzide) 12.5 mg capsule    Other Relevant Orders    Vascular US renal artery duplex complete    Case Request Cath Lab: Left And Right Heart Cath, Without LV (Completed)    Basic metabolic panel    Tachycardia R00.0     Other Visit Diagnoses         Codes    Abnormal stress test     R94.39    Relevant Orders    Case Request Cath Lab: Left And Right Heart Cath, Without LV (Completed)    CBC and Auto Differential    Bilateral carotid bruits     R09.89    Relevant Orders    Carotid duplex bilateral        Abnormal stress testing/marked hypertension--will add low-dose hydrochlorothiazide for little bit better blood pressure control.  Her creatinine and electrolytes previously been normal.  With the notation of abdominal atherosclerosis seen on CT scan will check renal arterial duplex.  She did have some EKG changes on the treadmill portion of the stress test but no nuclear changes.  Considering that this same event occurred when she needed PCI to the OM we will arrange for heart catheterization with Dr. Anderson.  She will otherwise continue her beta-blocker and losartan.  I do not feel that we can increase her beta-blocker as she has bradycardia at times with heart rates in the low 50s.  Carotid bruits--considering that she had the abdominal atherosclerosis and has PCI to the OM  possible she could have carotid disease as well.  Will check carotid arterial duplex.  Hypertension see #1.  Add low-dose hydrochlorothiazide.  Labs in 1 week to check electrolytes and renal function renal arterial duplex also ordered..  Aortic stenosis--continue to monitor with serial echocardiography.  Peak gradient noted on this most recent echocardiogram was 27 mmHg with a mean of 13.1..  History of tachycardia/SVT--with a dramatic change in her functional status with marked elevation in blood pressure she has not noted any episodes of tachycardia.  Overall patient's symptoms are quite concerning.  For now continue current medical therapy as lipids have been relatively well-controlled.  Again plan will be for carotid arterial duplex, renal arterial duplex, left heart catheterization and add hydrochlorothiazide for better blood pressure management.  Labs in 1 week's time.  Will review with Dr. Santos.      Heath Sharpe PA-C  3/11/2024  3:03 PM

## 2024-03-11 NOTE — PATIENT INSTRUCTIONS
Please continue your current medications.  If you have any change in your cardiorespiratory status please call the office right away.    Imaging for carotid arterial and renal arterial studies just to make sure no significant blockages there.  Once you start the hydrochlorothiazide be watching for symptoms of much lower blood pressures.  We do need to check lab work once you are on the hydrochlorothiazide in 1 week to make sure no electrolyte or renal dysfunction from the medication.  Will also be arranging for heart catheterization to check for any problems with the previously placed stent from 2020.

## 2024-03-13 ENCOUNTER — TELEPHONE (OUTPATIENT)
Dept: CARDIOLOGY | Facility: CLINIC | Age: 79
End: 2024-03-13
Payer: MEDICARE

## 2024-03-13 NOTE — TELEPHONE ENCOUNTER
Called her back and let her know Joseluis recommends trying it.  She will start today.  Reminded her to get blood work in about a week.  Reviewed signs and symptoms of dehydration - dark urine, low blood pressure, and feeling light headed.  She will monitor and call if she has any issues.

## 2024-03-13 NOTE — TELEPHONE ENCOUNTER
this patient called yesterday morning about an allergy (sulfa) in her new med that was given to her the med is hydroCHLOROthiazide (Microzide) 12.5 mg capsule she is wanting to know if there is something else she can take her phone number is [phone] thank you     Message sent to Joseluis Sharpe since he saw her 3/11/24 and ordered the medication.

## 2024-03-13 NOTE — TELEPHONE ENCOUNTER
----- Message from Heath Sharpe PA-C sent at 3/12/2024  3:25 PM EDT -----  Regarding: RE: Asking about new medication  Low likelihood of reaction…recommend trying  ----- Message -----  From: Nancy Cook RN  Sent: 3/12/2024   3:23 PM EDT  To: Heath Sharpe PA-C  Subject: Asking about new medication                      This patient called this morning about an allergy (sulfa) in her new med that was given to her the med is hydroCHLOROthiazide (Microzide) 12.5 mg capsule she is wanting to know if there is something else she can take her phone number is [phone] thank you

## 2024-03-20 ENCOUNTER — LAB (OUTPATIENT)
Dept: LAB | Facility: LAB | Age: 79
End: 2024-03-20
Payer: MEDICARE

## 2024-03-20 DIAGNOSIS — I10 ESSENTIAL HYPERTENSION: ICD-10-CM

## 2024-03-20 DIAGNOSIS — R94.39 ABNORMAL STRESS TEST: ICD-10-CM

## 2024-03-20 LAB
ANION GAP SERPL CALC-SCNC: 9 MMOL/L (ref 10–20)
BASOPHILS # BLD AUTO: 0.05 X10*3/UL (ref 0–0.1)
BASOPHILS NFR BLD AUTO: 0.6 %
BUN SERPL-MCNC: 19 MG/DL (ref 6–23)
CALCIUM SERPL-MCNC: 9.2 MG/DL (ref 8.6–10.3)
CHLORIDE SERPL-SCNC: 103 MMOL/L (ref 98–107)
CO2 SERPL-SCNC: 29 MMOL/L (ref 21–32)
CREAT SERPL-MCNC: 1.09 MG/DL (ref 0.5–1.05)
EGFRCR SERPLBLD CKD-EPI 2021: 51 ML/MIN/1.73M*2
EOSINOPHIL # BLD AUTO: 0.35 X10*3/UL (ref 0–0.4)
EOSINOPHIL NFR BLD AUTO: 4.2 %
ERYTHROCYTE [DISTWIDTH] IN BLOOD BY AUTOMATED COUNT: 13.4 % (ref 11.5–14.5)
GLUCOSE SERPL-MCNC: 105 MG/DL (ref 74–99)
HCT VFR BLD AUTO: 41.9 % (ref 36–46)
HGB BLD-MCNC: 13.2 G/DL (ref 12–16)
IMM GRANULOCYTES # BLD AUTO: 0.02 X10*3/UL (ref 0–0.5)
IMM GRANULOCYTES NFR BLD AUTO: 0.2 % (ref 0–0.9)
LYMPHOCYTES # BLD AUTO: 3.03 X10*3/UL (ref 0.8–3)
LYMPHOCYTES NFR BLD AUTO: 35.9 %
MCH RBC QN AUTO: 28.4 PG (ref 26–34)
MCHC RBC AUTO-ENTMCNC: 31.5 G/DL (ref 32–36)
MCV RBC AUTO: 90 FL (ref 80–100)
MONOCYTES # BLD AUTO: 0.66 X10*3/UL (ref 0.05–0.8)
MONOCYTES NFR BLD AUTO: 7.8 %
NEUTROPHILS # BLD AUTO: 4.32 X10*3/UL (ref 1.6–5.5)
NEUTROPHILS NFR BLD AUTO: 51.3 %
NRBC BLD-RTO: 0 /100 WBCS (ref 0–0)
PLATELET # BLD AUTO: 339 X10*3/UL (ref 150–450)
POTASSIUM SERPL-SCNC: 3.8 MMOL/L (ref 3.5–5.3)
RBC # BLD AUTO: 4.65 X10*6/UL (ref 4–5.2)
SODIUM SERPL-SCNC: 137 MMOL/L (ref 136–145)
WBC # BLD AUTO: 8.4 X10*3/UL (ref 4.4–11.3)

## 2024-03-20 PROCEDURE — 80048 BASIC METABOLIC PNL TOTAL CA: CPT

## 2024-03-20 PROCEDURE — 85025 COMPLETE CBC W/AUTO DIFF WBC: CPT

## 2024-03-20 PROCEDURE — 36415 COLL VENOUS BLD VENIPUNCTURE: CPT

## 2024-03-20 NOTE — RESULT ENCOUNTER NOTE
Labs reviewed status post office visit with adjustments in her medications.  Hydrochlorothiazide was added.  CBC is unremarkable and BMP shows normal potassium and creatinine stable at 1.09.  Information called to the patient and message left for her.

## 2024-03-21 ENCOUNTER — TELEPHONE (OUTPATIENT)
Dept: CARDIOLOGY | Facility: CLINIC | Age: 79
End: 2024-03-21
Payer: MEDICARE

## 2024-03-21 NOTE — TELEPHONE ENCOUNTER
She called in earlier today thinking she was returning Dr. Santos's call.  Looks like Joseluis tried to call her with her lab results.  I see she is scheduled for a heart cath tomorrow.  Called her back.  She tells me she got a call changing her arrival time to allow for extra hydration.  She said they did give her instructions - NPO after midnight, have a  and which medications to take prior to the cath tomorrow when they called to give her the new arrival time.  She tells me she has not questions.

## 2024-03-22 ENCOUNTER — HOSPITAL ENCOUNTER (OUTPATIENT)
Facility: HOSPITAL | Age: 79
Setting detail: OUTPATIENT SURGERY
Discharge: HOME | End: 2024-03-22
Attending: INTERNAL MEDICINE | Admitting: INTERNAL MEDICINE
Payer: MEDICARE

## 2024-03-22 VITALS
DIASTOLIC BLOOD PRESSURE: 80 MMHG | SYSTOLIC BLOOD PRESSURE: 181 MMHG | HEART RATE: 58 BPM | RESPIRATION RATE: 20 BRPM | TEMPERATURE: 98 F | OXYGEN SATURATION: 100 %

## 2024-03-22 DIAGNOSIS — R94.39 ABNORMAL STRESS TEST: ICD-10-CM

## 2024-03-22 DIAGNOSIS — I10 ESSENTIAL HYPERTENSION: Primary | ICD-10-CM

## 2024-03-22 DIAGNOSIS — R07.89 OTHER CHEST PAIN: ICD-10-CM

## 2024-03-22 DIAGNOSIS — I50.42 CHRONIC COMBINED SYSTOLIC (CONGESTIVE) AND DIASTOLIC (CONGESTIVE) HEART FAILURE (MULTI): ICD-10-CM

## 2024-03-22 LAB
BASE EXCESS BLDV CALC-SCNC: 4.8 MMOL/L (ref -2–3)
BODY TEMPERATURE: 37 DEGREES CELSIUS
HCO3 BLDV-SCNC: 30.4 MMOL/L (ref 22–26)
OXYHGB MFR BLDV: 68.5 % (ref 45–75)
PCO2 BLDV: 48 MM HG (ref 41–51)
PH BLDV: 7.41 PH (ref 7.33–7.43)
PO2 BLDV: 45 MM HG (ref 35–45)
SAO2 % BLDV: 70 % (ref 45–75)

## 2024-03-22 PROCEDURE — 99153 MOD SED SAME PHYS/QHP EA: CPT | Performed by: INTERNAL MEDICINE

## 2024-03-22 PROCEDURE — 93453 R&L HRT CATH W/VENTRICLGRPHY: CPT | Performed by: INTERNAL MEDICINE

## 2024-03-22 PROCEDURE — 2500000005 HC RX 250 GENERAL PHARMACY W/O HCPCS: Performed by: INTERNAL MEDICINE

## 2024-03-22 PROCEDURE — 96373 THER/PROPH/DIAG INJ IA: CPT | Mod: 59 | Performed by: INTERNAL MEDICINE

## 2024-03-22 PROCEDURE — 2780000003 HC OR 278 NO HCPCS: Performed by: INTERNAL MEDICINE

## 2024-03-22 PROCEDURE — C1894 INTRO/SHEATH, NON-LASER: HCPCS | Performed by: INTERNAL MEDICINE

## 2024-03-22 PROCEDURE — 2550000001 HC RX 255 CONTRASTS: Performed by: INTERNAL MEDICINE

## 2024-03-22 PROCEDURE — 2500000004 HC RX 250 GENERAL PHARMACY W/ HCPCS (ALT 636 FOR OP/ED): Performed by: NURSE PRACTITIONER

## 2024-03-22 PROCEDURE — 2720000007 HC OR 272 NO HCPCS: Performed by: INTERNAL MEDICINE

## 2024-03-22 PROCEDURE — 99152 MOD SED SAME PHYS/QHP 5/>YRS: CPT | Performed by: INTERNAL MEDICINE

## 2024-03-22 PROCEDURE — C1887 CATHETER, GUIDING: HCPCS | Performed by: INTERNAL MEDICINE

## 2024-03-22 PROCEDURE — 93456 R HRT CORONARY ARTERY ANGIO: CPT | Performed by: INTERNAL MEDICINE

## 2024-03-22 PROCEDURE — C1769 GUIDE WIRE: HCPCS | Performed by: INTERNAL MEDICINE

## 2024-03-22 PROCEDURE — 2500000004 HC RX 250 GENERAL PHARMACY W/ HCPCS (ALT 636 FOR OP/ED): Mod: JZ | Performed by: INTERNAL MEDICINE

## 2024-03-22 PROCEDURE — 7100000010 HC PHASE TWO TIME - EACH INCREMENTAL 1 MINUTE: Performed by: INTERNAL MEDICINE

## 2024-03-22 PROCEDURE — 7100000009 HC PHASE TWO TIME - INITIAL BASE CHARGE: Performed by: INTERNAL MEDICINE

## 2024-03-22 PROCEDURE — C1760 CLOSURE DEV, VASC: HCPCS | Performed by: INTERNAL MEDICINE

## 2024-03-22 PROCEDURE — 82805 BLOOD GASES W/O2 SATURATION: CPT

## 2024-03-22 RX ORDER — MORPHINE SULFATE 2 MG/ML
2 INJECTION, SOLUTION INTRAMUSCULAR; INTRAVENOUS EVERY 6 HOURS PRN
Status: DISCONTINUED | OUTPATIENT
Start: 2024-03-22 | End: 2024-03-22 | Stop reason: HOSPADM

## 2024-03-22 RX ORDER — ACETAMINOPHEN 160 MG/5ML
650 SOLUTION ORAL EVERY 6 HOURS PRN
Status: DISCONTINUED | OUTPATIENT
Start: 2024-03-22 | End: 2024-03-22 | Stop reason: HOSPADM

## 2024-03-22 RX ORDER — SODIUM CHLORIDE 9 MG/ML
1.5 INJECTION, SOLUTION INTRAVENOUS CONTINUOUS
Status: DISCONTINUED | OUTPATIENT
Start: 2024-03-22 | End: 2024-03-22 | Stop reason: HOSPADM

## 2024-03-22 RX ORDER — ACETAMINOPHEN 650 MG/1
650 SUPPOSITORY RECTAL EVERY 6 HOURS PRN
Status: DISCONTINUED | OUTPATIENT
Start: 2024-03-22 | End: 2024-03-22 | Stop reason: HOSPADM

## 2024-03-22 RX ORDER — SODIUM CHLORIDE 9 MG/ML
75 INJECTION, SOLUTION INTRAVENOUS CONTINUOUS
Status: DISCONTINUED | OUTPATIENT
Start: 2024-03-22 | End: 2024-03-22 | Stop reason: HOSPADM

## 2024-03-22 RX ORDER — FENTANYL CITRATE 50 UG/ML
INJECTION, SOLUTION INTRAMUSCULAR; INTRAVENOUS AS NEEDED
Status: DISCONTINUED | OUTPATIENT
Start: 2024-03-22 | End: 2024-03-22 | Stop reason: HOSPADM

## 2024-03-22 RX ORDER — LIDOCAINE HYDROCHLORIDE 20 MG/ML
INJECTION, SOLUTION INFILTRATION; PERINEURAL AS NEEDED
Status: DISCONTINUED | OUTPATIENT
Start: 2024-03-22 | End: 2024-03-22 | Stop reason: HOSPADM

## 2024-03-22 RX ORDER — MIDAZOLAM HYDROCHLORIDE 1 MG/ML
INJECTION, SOLUTION INTRAMUSCULAR; INTRAVENOUS AS NEEDED
Status: DISCONTINUED | OUTPATIENT
Start: 2024-03-22 | End: 2024-03-22 | Stop reason: HOSPADM

## 2024-03-22 RX ORDER — SODIUM CHLORIDE 9 MG/ML
1000 INJECTION, SOLUTION INTRAVENOUS ONCE AS NEEDED
Status: DISCONTINUED | OUTPATIENT
Start: 2024-03-22 | End: 2024-03-22 | Stop reason: HOSPADM

## 2024-03-22 RX ORDER — ACETAMINOPHEN 325 MG/1
650 TABLET ORAL EVERY 6 HOURS PRN
Status: DISCONTINUED | OUTPATIENT
Start: 2024-03-22 | End: 2024-03-22 | Stop reason: HOSPADM

## 2024-03-22 RX ORDER — NITROGLYCERIN 40 MG/100ML
INJECTION INTRAVENOUS AS NEEDED
Status: DISCONTINUED | OUTPATIENT
Start: 2024-03-22 | End: 2024-03-22 | Stop reason: HOSPADM

## 2024-03-22 RX ADMIN — SODIUM CHLORIDE 75 ML/HR: 9 INJECTION, SOLUTION INTRAVENOUS at 07:45

## 2024-03-22 ASSESSMENT — PAIN SCALES - GENERAL
PAINLEVEL_OUTOF10: 0 - NO PAIN

## 2024-03-22 ASSESSMENT — COLUMBIA-SUICIDE SEVERITY RATING SCALE - C-SSRS
2. HAVE YOU ACTUALLY HAD ANY THOUGHTS OF KILLING YOURSELF?: NO
6. HAVE YOU EVER DONE ANYTHING, STARTED TO DO ANYTHING, OR PREPARED TO DO ANYTHING TO END YOUR LIFE?: NO
1. IN THE PAST MONTH, HAVE YOU WISHED YOU WERE DEAD OR WISHED YOU COULD GO TO SLEEP AND NOT WAKE UP?: NO

## 2024-03-22 ASSESSMENT — PAIN - FUNCTIONAL ASSESSMENT
PAIN_FUNCTIONAL_ASSESSMENT: 0-10

## 2024-03-22 NOTE — Clinical Note
[facility] JL 4.0, 100C.

## 2024-03-22 NOTE — NURSING NOTE
Discharge instructions reviewed with pt and family, questions and concerns answered and addressed. Medications and follow up appointment reviewed as well, no further questions. Groin and brachial site free of bleeding/hematoma, vitals remain stable. Pt wheeled out in stable condition, family to resume care.

## 2024-03-22 NOTE — PRE-SEDATION DOCUMENTATION
Sedation Plan    ASA 3     Mallampati class: II.    Risks, benefits, and alternatives discussed with patient.    Moderate Sedation     Physical Exam  Constitutional:       Appearance: Normal appearance.   HENT:      Mouth/Throat:      Mouth: Mucous membranes are moist.   Cardiovascular:      Rate and Rhythm: Normal rate.      Pulses: Normal pulses.      Heart sounds: Murmur heard.   Pulmonary:      Effort: Pulmonary effort is normal.      Breath sounds: Normal breath sounds.   Skin:     General: Skin is warm and dry.      Capillary Refill: Capillary refill takes less than 2 seconds.   Neurological:      General: No focal deficit present.      Mental Status: She is alert and oriented to person, place, and time.   Psychiatric:         Mood and Affect: Mood normal.         Behavior: Behavior normal.     right radial pulse 1+ with leann ZAMORA

## 2024-03-22 NOTE — DISCHARGE INSTRUCTIONS
If you have any questions, please call the Cath Lab Nurse Practitioner Aliciakyler Lundy at [phone] and leave a message. She will return your call the same day if calling before 3 PM, M-F. If you call on the weekend you can expect a call back on Monday morning. You may also call the Cath Lab at [phone] M-F, 7-3:30 with any questions. Weekends and after hours please call your cardiologist office number to reach a physician on call. The heart group office number is [phone]           CARDIAC CATHETERIZATION DISCHARGE INSTRUCTIONS     FOR SUDDEN AND SEVERE CHEST PAIN, SHORTNESS OF BREATH, EXCESSIVE BLEEDING, SIGNS OF STROKE, OR CHANGES IN MENTAL STATUS YOU SHOULD CALL 911 IMMEDIATELY.     If your provider has prescribed aspirin and/or clopidogrel (Plavix), or prasugrel (Effient), or ticagrelor (Brilinta), DO NOT STOP THESE MEDICATIONS for any reason without talking to your cardiologist first. If any of these were prescribed, you must take them every day without missing a single dose. If you are getting low on these medications, contact your provider immediately for a refill.     FOR NEXT 24 HOURS  - Upon discharge, you should return home and rest for the remainder of the day and evening. You do not have to stay on bed rest but should not be very active.  It is recommended a responsible adult be with you for the first 24 hours after the procedure.    - No driving for 24 hours after procedure. Please arrange for someone to drive you home from the hospital today.     - Do not drive, operate machinery, or use power tools for 24 hours after your procedure.     - Do not make any legal decisions for 24 hours after your procedure.     - Do not drink alcoholic beverages for 24 hours after your procedure.    WOUND CARE   *FOR FEMORAL (LEG) ACCESS*  ·      Avoid heavy lifting (over 10 pounds) for 7 days, squatting or excessive bending for 2 days, and strenuous exercise for 7 days.  ·      No submerged bathing, swimming, or  hot tubs for the next 7 days, or until fully healed.  ·      Avoid sexual activity for 3-4 days until any groin discomfort has ceased.     *FOR RADIAL (WRIST) ACCESS*  ·      No lifting more than 5 pounds or excessive use of the wrist for 24 hours - for example, treat your wrist as if it is sprained.  ·      Do not engage in vigorous activities (tennis, golf, bowling, weights) for at least 48 hours after the procedure.  ·      Do not submerge the wrist for 7 days after the procedure.  ·      You should expect mild tingling in your hand and tenderness at the puncture site for up to 3 days.    - The transparent dressing should be removed from the site 24 hours after the procedure.  Wash the site gently with soap and water. Rinse well and pat dry. Keep the area clean and dry. You may apply a Band-Aid to the site. Avoid lotions, ointments, or powders until fully healed.     - You may shower the day after your procedure.      - It is normal to notice a small bruise around the puncture site and/or a small grape sized or smaller lump. Any large bruising or large lump warrants a call to the office.     - If bleeding should occur, lay down and apply pressure to the affected area for 10 minutes.  If the bleeding stops notify your physician.  If there is a large amount of bleeding or spurting of blood CALL 911 immediately.  DO NOT drive yourself to the hospital.    - You may experience some tenderness, bruising or minimal inflammation.  If you have any concerns, you may contact the Cath Lab or if any of these symptoms become excessive, contact your cardiologist or go to the emergency room.     OTHER INSTRUCTIONS  - You may take acetaminophen (Tylenol) as directed for discomfort.  If pain is not relieved with acetaminophen (Tylenol), contact your doctor.    - If you notice or experience any of the following, you should notify your doctor or seek medical attention  Chest pain or discomfort  Change in mental status or weakness in  extremities.  Dizziness, light headedness, or feeling faint.  Change in the site where the procedure was performed, such as bleeding or an increased area of bruising or swelling.  Tingling, numbness, pain, or coolness in the leg/arm beyond the site where the procedure was performed.  Signs of infection (i.e. shaking chills, temperature > 100 degrees Fahrenheit, warmth, redness) in the leg/arm area where the procedure was performed.  Changes in urination   Bloody or black stools  Vomiting blood  Severe nose bleeds  Any excessive bleeding    - If you DO NOT have an appointment with your cardiologist within 2-4 weeks following your procedure, please contact their office.      Important ways to reduce your risk of coronary artery disease by healthy diet, maintaining a healthy weight, exercising regularly and stop using tobacco products.     Mediterranean Diet    About this topic  This is a heart healthy diet. It is based on widely used foods and cooking styles from many countries around the [place]. The main pattern for the diet is more plant foods and monounsaturated fats, or good fats, like olive oil. Protein in this diet comes from seafood, legumes, nuts, seeds, and poultry and eggs in lowered amounts. You will also eat more whole grains, vegetables, and fruits and moderate amounts of alcohol are also included. This diet has less red meats, dairy products, and processed foods.    What will the results be?  Your diet will have less saturated fat, cholesterol, calories, sodium, and added sugars. Your diet will be higher in fiber. This will help to keep your blood sugar steady. This diet lowers the chance of heart disease and other health problems.  What lifestyle changes are needed?  If you do not often eat this way, you will need to change your eating habits. Be sure to get regular exercise. It is believed to help the health benefits of this diet.  What changes to diet are needed?  You may need to limit the  "amount of red meat and processed foods in your diet. Ask your dietitian for help planning meals that are right for you.  What foods are good to eat?  Plenty of fish and other seafood  Fresh, frozen, or canned fruits and vegetables  Nuts and nut butters and dried beans, lentils, or peas  Foods high in fiber like whole grains and whole grain products  Olive oil (good fat), peanut or canola oil, margarine, or spreads that list vegetable oil as the first ingredient and do not contain trans fat or partially hydrogenated oil  Small amounts of poultry and eggs  What foods should be limited or avoided?  Red meats  Sweets, desserts, and processed foods  Butter, oils, and fats that contain trans fats or are hydrogenated or partially hydrogenated  Gravies and sauces  What problems could happen?  Your weight may rise because your diet will be higher in fat from olive oil and nuts.  You may have lower iron levels. Be sure to eat foods rich in iron. Also, eat foods rich in vitamin C. This will help your body take in iron.  You may have lower calcium levels because you are eating less dairy products. Ask your doctor if you need to take a calcium supplement.  Wine is often thought of as part of a Mediterranean diet. It is not needed and you may choose not to include it. Avoid wine if you are prone to alcohol abuse or are pregnant. Also, avoid it if you are at risk for breast cancer, have liver problems, or have other illnesses that make it important for you to not have alcohol.  When do I need to call the doctor?  If you have any concerns about your diet     Health risks of obesity    The Basics  Written by the doctors and editors at [facility]  What does it mean to have obesity? -- Doctors use a calculation called \"body mass index,\" or \"BMI,\" to decide whether a person is underweight, at a healthy weight, overweight, or has obesity.  Your BMI will tell you which category you are in based on your weight and height (figure 1):  ?If " "your BMI is between 25 and 29.9, you are overweight.  ?If your BMI is 30 or greater, you have obesity.  Obesity is a problem, because it increases the risks of many different health problems. It can also make it hard for you to move, breathe, and do other things that people who are at a healthy weight can do easily.  What are the health risks of obesity? -- Having obesity increases a person's risk of developing many health problems. Here are just a few examples:  ?Diabetes  ?High blood pressure  ?High cholesterol  ?Heart disease (including heart attacks)  ?Stroke  ?Sleep apnea (a disorder in which you stop breathing for short periods while asleep)  ?Asthma  ?Cancer  Does having obesity shorten a person's life? -- Yes. Studies show that people with obesity die younger than people who are a healthy weight. They also show that the risk of death goes up the heavier a person is. The degree of increased risk depends on how long the person has had obesity, and on what other medical problems they have.  People with \"central obesity\" might also be at risk of dying younger. Central obesity means carrying extra weight in the belly area, even if the BMI is normal.  Should I see an expert? -- Yes. If you are overweight or have obesity, you can talk to your doctor or nurse. They might have suggestions for healthy ways to lose weight. It can also help to work with a dietitian (food and nutrition expert). A dietitian can help you choose healthy foods and plan meals.  Are there medical treatments that can help me lose weight? -- Yes. There are medicines and surgery to help with weight loss. But those treatments are only for people who have not been able to lose weight through lifestyle changes such as diet and exercise. Also, weight loss treatments do not take the place of diet and exercise. People who have those treatments must also change how they eat and how active they are.  What can I do to prevent the problems caused by " obesity? -- The best thing that you can do is lose weight. But even if weight loss is not possible, you can improve your health and lower your risk if you:  ?Become more active - Many types of physical activity can help, including walking. You can start with a few minutes a day and add more as you get stronger and build up your endurance. Anything that gets your body moving is good for you.  ?Improve your diet - It is healthy to have regular meal times, eat smaller portions, and not skip meals. Limit sweets, and avoid processed foods. Try to eat more vegetables and fruits instead.  ?Quit smoking (if you smoke) - Some people start eating more after they stop smoking, so try to make healthy food choices. Even if it increases your appetite, quitting smoking is still one of the best things that you can do to improve your health.  ?Limit alcohol - For females, drink no more than 1 drink a day. For males, drink no more than 2 drinks a day.  What causes obesity? -- The thing that increases a person's risk the most is having an unhealthy lifestyle. Most people develop obesity because they eat too much, eat unhealthy foods, and move too little. That's especially true of people who watch too much TV. But there are also other things that seem to increase the risk of obesity that many people do not know about. Here are some things that might affect a person's chance of developing obesity:  ?Mother's habits during and after pregnancy - People who eat a lot of calories, have diabetes, or smoke during pregnancy have a higher chance of having babies who have obesity as adults. Also, babies who drink formula might be more likely than  babies to develop obesity later in life.  ?Habits and weight gain during childhood - Children or teens who are overweight or have obesity are more likely to become have obesity as an adult.  ?Sleeping too little - People who do not get enough sleep are more likely to develop obesity than  "people who sleep enough.  ?Taking certain medicines - Long-term use of certain medicines, such as some medicines to treat depression, can cause weight gain. If you are concerned that 1 of your medicines might be making you gain weight, talk to your doctor or nurse. They might be able to switch you to a different medicine instead.  ?Certain hormonal conditions - Some hormonal problems can increase the risk of developing obesity. For example, a condition called \"polycystic ovary syndrome\" can cause weight gain, along with other symptoms like irregular periods.  What if I want to get pregnant? -- If you are overweight or have obesity, it might be harder to get pregnant. For males, obesity can also cause sex problems, like having trouble getting or keeping an erection. This is more likely if you also have high blood pressure or diabetes.  What if my child has obesity? -- In children, obesity has many of the same risks as it does in adults. For example, it can increase the risk of diabetes, high blood pressure, asthma, and sleep apnea. It can also cause added problems related to childhood. For example, obesity can make children grow faster than normal and cause girls to go through puberty earlier than usual.  All topics are updated as new evidence becomes available and our peer review process is complete.  This topic retrieved from "[employer] on: Jan 11, 2024.  Topic 66563 Version 17.0  Release: 31.6.4 - C32.10  © 2024 [employer] and/or its affiliates. All rights reserved.  figure 1: Your body mass index (BMI)        If you use tobacco products please review   Quitting smoking    The Basics  Written by the doctors and editors at "[employer]  What are the benefits of quitting smoking? -- Quitting smoking can dramatically improve your health and help you live longer. It lowers your risk of heart disease, lung disease, kidney failure, cancer, infection, stomach problems, and diabetes.  Quitting smoking can also lower your " "chances of getting osteoporosis, a condition that makes your bones weak.  Quitting is not easy for most people, and it might take several tries to completely quit. But help and support are available. Quitting smoking will improve your health no matter how old you are, even if you have smoked for a long time.  What should I do if I want to quit smoking? -- It's a good idea to start by talking with your doctor or nurse. It is possible to quit on your own, without help. But getting help greatly increases your chances of quitting successfully.  When you are ready to quit, you will make a plan to:  ?Set a quit date.  ?Tell your family and friends that you plan to quit.  ?Plan ahead for the challenges you will face, such as cigarette cravings.  ?Remove cigarettes from your home, car, and work.  How can my doctor or nurse help? -- Your doctor or nurse can give you advice on the best way to quit. They can also give you medicines to:  ?Reduce your craving for cigarettes  ?Reduce your \"withdrawal\" symptoms (symptoms that happen when you stop smoking)  Your doctor or nurse can also help you find a counselor to talk to. For most people who are trying to quit smoking, it works best to use both medicines and counseling.  You can also get help from a free phone line (5-752-QUITNOW, or 1-228.500.7912) or go online to www.smokefree.gov.  What are the symptoms of withdrawal? -- When you stop smoking, you might have symptoms such as:  ?Trouble sleeping  ?Feeling irritable, anxious, or restless  ?Getting frustrated or angry  ?Having trouble thinking clearly  These symptoms can be hard to deal with, which is why it can be so hard to quit. But medicines can help.  Some people who stop smoking become temporarily depressed. Some people need treatment for depression, such as counseling or medicines or both. People with depression might:  ?No longer enjoy or care about doing the things they used to like to do  ?Feel sad, down, hopeless, " nervous, or cranky most of the day, almost every day  ?Lose or gain weight  ?Sleep too much or too little  ?Feel tired or like they have no energy  ?Feel guilty or like they are worth nothing  ?Forget things or feel confused  ?Move and speak more slowly than usual  ?Act restless or have trouble staying still  ?Think about death or suicide  If you think you might be depressed, tell your doctor or nurse right away. They can talk to you about your symptoms and recommend treatment if needed.  Get help right away if you are thinking of hurting or killing yourself! -- Sometimes, people with depression think of hurting or killing themselves. If you ever feel like you might hurt yourself, help is available:  ?In the , contact the [employer] Suicide & Crisis Lifeline:  To speak to someone, call or text [employer].  To talk to someone online, go to www.[employer]/chat.  ?Call your doctor or nurse and tell them that it is an emergency.  ?Call for an ambulance (in the [place] and [place], call 9-1-1).  ?Go to the emergency department at your local hospital.  If you think your partner might have depression, or if you are worried that they might hurt themselves, get them help right away.  How does counseling work? -- A counselor can help you figure out:  ?What triggers you to want to smoke, and how to handle these situations  ?How to resist cravings  ?What you can do differently if you have tried to quit before  You can meet with a counselor in 1-on-1 sessions or as part of a group. There are other ways to get counseling, too, such as over the phone, through text messaging, or online.  How do medicines help you stop smoking? -- Different medicines work in different ways:  ?Nicotine replacement therapy - Nicotine is the main drug in cigarettes, and the reason they are addictive. These medicines reduce your body's craving for nicotine. They also help with withdrawal symptoms.  There are different forms of nicotine replacement, including skin  "patches, lozenges, gum, nasal sprays, and inhalers. Most can be bought without a prescription. Also, health insurance might cover some or all of the cost.  It often helps to use 2 forms of nicotine replacement. For example, you might wear a patch all the time, plus use gum or lozenges when you get a craving to smoke.  ?Varenicline - Varenicline (brand names: Chantix, Champix) is a prescription medicine that reduces withdrawal symptoms and cigarette cravings. Varenicline can increase the effects of alcohol in some people. It's a good idea to limit drinking while you're taking it, at least until you know how it affects you.  Even if you are not yet ready to commit to a quit date, varenicline can help reduce cravings. This can make it easier to quit when you are ready.  ?Bupropion - Bupropion (sample brand names: Wellbutrin, Zyban) is a prescription medicine that reduces your desire to smoke. It is also available in a generic version, which is cheaper than the brand name medicines. Doctors do not usually prescribe bupropion for people with seizures or who have had seizures in the past.  It might also be helpful to combine nicotine replacement with bupropion or varenicline. In some cases, a person might even take both bupropion and varenicline. Your doctor or nurse can help you figure out the best combination for you.  What about e-cigarettes? -- Sometimes people wonder if using electronic cigarettes, or \"e-cigarettes,\" can help them quit smoking. Using e-cigarettes is also called \"vaping.\" Doctors do not recommend e-cigarettes in place of using of medicines and counseling. That's because e-cigarettes still contain nicotine as well as other substances that might be harmful. It's also not clear how they can affect a person's health in the long term.  What if I am pregnant and I smoke? -- If you are pregnant, it's really important for the health of your baby that you quit. Ask your doctor what options you have, and what " is safest for your baby.  What if I have already smoked for a long time? -- The longer you have smoked, the higher your chances are of having health problems. But it is never too late to quit smoking. It helps your health even if you are older or have smoked for many years. The best thing you can do to lower your chance of having a health problem caused by smoking is to quit.  Will I gain weight if I quit? -- You might gain a few pounds. This can be frustrating for some people. But it's important to remember that you are improving your health by quitting smoking. You can help prevent gaining a lot of weight by staying active and eating a healthy diet.  What if I am not able to quit? -- If you don't quit on your first try, or if you quit but then start smoking again, don't give up hope. Lots of people have to try more than once before they are able to completely quit.  It might help to try to understand why quitting did not work. There might be something you can do differently when you try again. It can help to figure out which situations make you want to smoke, so you can avoid them.  What else can I do to improve my chances of quitting? -- You can:  ?Get regular exercise. Any type of physical activity, even gentle forms of movement, is good for your health. Physical activity can also help reduce stress.  ?Stay away from people who smoke and places that make you want to smoke. If people close to you smoke, ask them to quit with you or avoid smoking around you.  ?Carry gum, hard candy, or something to put in your mouth. If you get a craving for a cigarette, try 1 of these instead.  ?Don't give up, even if you start smoking again. It takes most people a few tries before they succeed.  All topics are updated as new evidence becomes available and our peer review process is complete.

## 2024-03-22 NOTE — Clinical Note
Sheath was exchanged in the right femoral artery with ACCESS KIT, S-GENE MINI, 4FR 10CM 0.018IN 40CM, NT/PT, ECHO ENHANCE NEEDLE.

## 2024-03-25 NOTE — RESULT ENCOUNTER NOTE
Patient was seen for marked elevations in blood pressure.  Heart catheterization showed no obstructive coronary disease.  Medications have been adjusted but she states that by late afternoon blood pressures are still elevated.  We will change the timing of her hydrochlorothiazide to save maybe 1 or 2:00 in the afternoon and she will continue to monitor blood pressures.  She tells me that she is due for an upcoming carotid and renal arterial duplex.  She otherwise voices no complaints with this call.

## 2024-03-29 ENCOUNTER — HOSPITAL ENCOUNTER (OUTPATIENT)
Dept: VASCULAR MEDICINE | Facility: HOSPITAL | Age: 79
Discharge: HOME | End: 2024-03-29
Payer: MEDICARE

## 2024-03-29 DIAGNOSIS — I10 ESSENTIAL HYPERTENSION: ICD-10-CM

## 2024-03-29 DIAGNOSIS — R09.89 BILATERAL CAROTID BRUITS: ICD-10-CM

## 2024-03-29 DIAGNOSIS — I65.23 OCCLUSION AND STENOSIS OF BILATERAL CAROTID ARTERIES: ICD-10-CM

## 2024-03-29 PROCEDURE — 93975 VASCULAR STUDY: CPT

## 2024-03-29 PROCEDURE — 93975 VASCULAR STUDY: CPT | Performed by: INTERNAL MEDICINE

## 2024-03-29 PROCEDURE — 93880 EXTRACRANIAL BILAT STUDY: CPT | Performed by: INTERNAL MEDICINE

## 2024-03-29 PROCEDURE — 93880 EXTRACRANIAL BILAT STUDY: CPT

## 2024-03-29 NOTE — RESULT ENCOUNTER NOTE
Patient with carotid arterial and renal arterial duplex testing after office visit.  Fortunately your heart catheterization did not show any significant blockages.  Patient noted to have 50 to 69% right carotid arterial stenosis and 60% right renal artery stenosis.  For now patient will continue current medical therapy and I have sent message to Dr. Santos for further advisement as to whether or not she wants the patient referred to vascular surgery and the patient is scheduled for follow-up in the office on April 11 as well.

## 2024-04-05 NOTE — PROGRESS NOTES
Chief Complaint/Reason for Visit:   S/p [facility]     History Of Present Illness:    Ms Thacker is coming today status post left heart catheterization.  We have followed this patient previously for PSVT, coronary artery disease with prior PCI to the LCx, aortic valve stenosis, and hypertension.  She recently was found to have an abnormal stress test that was done due to complaints of fatigue and shortness of breath.  Heart catheterization done March 22, 2024 showed patent LCx stent, right heart catheterization showed normal filling pressures and preserved cardiac output/index.    Patient comes in today generally feeling well.  She takes her blood pressure routinely and brings in a blood pressure log.  Her home blood pressures have been fairly labile but have seemed to improve with the addition of hydrochlorothiazide which was started in March.  Patient is also been seen by Dr. Carey today for her carotid stenosis and renal artery stenosis.  Medical therapy has been recommended.    Past Medical History:  She has a past medical history of Abnormal result of other cardiovascular function study (02/14/2020), Colostomy status (CMS/[facility]) (11/23/2020), Encounter for other preprocedural examination (08/20/2020), Encounter for preprocedural cardiovascular examination (11/19/2020), Fibrosclerosis of unspecified breast, Fistula of intestine (01/08/2021), Nausea (10/05/2020), Other long term (current) drug therapy (07/16/2020), Other symptoms and signs involving the genitourinary system (10/09/2020), Personal history of diseases of the skin and subcutaneous tissue (06/30/2021), Personal history of other diseases of the circulatory system (08/13/2019), Personal history of other diseases of the digestive system, Personal history of other diseases of the digestive system (12/16/2020), Personal history of other diseases of the digestive system (12/15/2020), Personal history of other infectious and parasitic diseases (06/30/2021), Personal  history of other malignant neoplasm of skin, Personal history of urinary (tract) infections (10/13/2020), and Unspecified abdominal pain (10/05/2020).    Past Surgical History:  She has a past surgical history that includes Other surgical history (02/14/2019); Other surgical history (02/14/2019); Other surgical history (02/14/2019); Other surgical history (08/13/2019); Other surgical history (08/13/2019); Other surgical history (02/19/2020); and Cardiac catheterization (N/A, 3/22/2024).      Social History:  She reports that she has quit smoking. Her smoking use included cigarettes. She has never been exposed to tobacco smoke. She has never used smokeless tobacco. She reports current alcohol use. She reports that she does not use drugs.    Family History:  Family History   Problem Relation Name Age of Onset    Kidney disease Father      Heart disease Father      Hypertension Other          Allergies:  Adhesive tape-silicones, Ciprofloxacin, Latex, Lisinopril, and Sulfa (sulfonamide antibiotics)    Medications:  Current Outpatient Medications   Medication Instructions    acetaminophen-codeine (Tylenol w/ Codeine #3) 300-30 mg tablet 1 tablet, oral, As needed    aspirin 81 mg EC tablet oral    atorvastatin (LIPITOR) 20 mg, oral, Nightly    hydroCHLOROthiazide (MICROZIDE) 12.5 mg, oral, Every morning    losartan (COZAAR) 50 mg, oral, 2 times daily    metoprolol tartrate (LOPRESSOR) 12.5 mg, oral, 2 times daily    multivitamin tablet 1 tablet, oral, Daily       Review of Systems:  Review of Systems   Constitutional: Negative. Negative for decreased appetite and malaise/fatigue.   HENT: Negative.     Eyes:  Negative for blurred vision and visual disturbance.   Cardiovascular:  Negative for chest pain, dyspnea on exertion, irregular heartbeat, leg swelling, orthopnea, palpitations and syncope.   Respiratory: Negative.  Negative for cough and shortness of breath.    Musculoskeletal:  Negative for arthritis and falls.  "  Gastrointestinal: Negative.    Neurological:  Negative for focal weakness and light-headedness.   Psychiatric/Behavioral:  Negative for depression and memory loss.         Vitals  Visit Vitals  /84 (BP Location: Left arm, Patient Position: Sitting, BP Cuff Size: Adult)   Pulse 65   Ht 1.626 m (5' 4\")   Wt 71.7 kg (158 lb)   SpO2 97%   BMI 27.12 kg/m²   Smoking Status Former   BSA 1.8 m²        Physical Exam:  Constitutional:       General: She is not in acute distress.     Appearance: Normal appearance.   HENT:      Mouth/Throat:      Mouth: Mucous membranes are moist.   Neck:      Comments: Bilateral carotid bruits  Cardiovascular:      Rate and Rhythm: Normal rate and regular rhythm.      Heart sounds: Murmur (Systolic murmur noted at the base) heard.   Pulmonary:      Effort: Pulmonary effort is normal.      Breath sounds: Normal breath sounds.   Abdominal:      General: Bowel sounds are normal.      Palpations: Abdomen is soft.      Tenderness: There is no abdominal tenderness.   Musculoskeletal:         General: No swelling.      Right lower leg: No edema.      Left lower leg: No edema.   Patient reports that her femoral sites have healed.  Skin:     General: Skin is warm and dry.   Neurological:      Mental Status: She is alert and oriented to person, place, and time.   Psychiatric:         Behavior: Behavior is cooperative.      Last Labs:  CBC -  Lab Results   Component Value Date    WBC 8.4 03/20/2024    HGB 13.2 03/20/2024    HCT 41.9 03/20/2024    MCV 90 03/20/2024     03/20/2024     Lab Results   Component Value Date    GLUCOSE 105 (H) 03/20/2024    CALCIUM 9.2 03/20/2024     03/20/2024    K 3.8 03/20/2024    CO2 29 03/20/2024     03/20/2024    BUN 19 03/20/2024    CREATININE 1.09 (H) 03/20/2024      CMP -  Lab Results   Component Value Date    CALCIUM 9.2 03/20/2024    PHOS 5.2 (H) 09/10/2020    PROT 6.5 12/06/2023    ALBUMIN 4.0 12/06/2023    AST 22 12/06/2023    ALT 23 " 12/06/2023    ALKPHOS 72 12/06/2023    BILITOT 0.4 12/06/2023       LIPID PANEL -   Lab Results   Component Value Date    CHOL 162 12/06/2023    TRIG 124 12/06/2023    HDL 66.8 12/06/2023    CHHDL 2.4 12/06/2023    LDLF 79 01/27/2023    VLDL 25 12/06/2023    NHDL 95 12/06/2023       Lab Results   Component Value Date    HGBA1C 5.9 (H) 12/29/2023       Last Cardiology Tests:  Echo:  Transthoracic Echo (TTE) Complete 01/15/2024--CONCLUSIONS:   1. Left ventricular systolic function is normal with a 60% estimated ejection fraction.   2. Mild to moderate aortic valve stenosis.     Stress Test:  Nuclear Stress Test 02/21/2024--IMPRESSION:  Normal exercise stress myocardial perfusion imaging at level of  stress achieved. Poor exercise tolerance.    Cath:3-22-24  CONCLUSIONS:   1. Left heart cath revealed non obstructive coronary artery disease with patent LCX stent.   2. RHC revealed normal filling pressure with preserved cardiac output and index.   3. Successful 6Fr VIP angioseal deployement in right CFA.       Lab review: I have personally reviewed the laboratory result(s)     Assessment/Plan:  ASHD: Patient has a history of a prior PCI to the LCx (2020.  Heart catheterization done March 22 showed a patent stent, nonobstructive coronary artery disease.  Right heart cath showed normal filling pressures, normal cardiac output.  Patient is angina class 0.  She will continue on aspirin, atorvastatin, metoprolol, and losartan.    Hypertension: Patient's blood pressure is very labile.  Blood pressure in the office today shows excellent control at 122/84 but her home blood pressure log shows it jumping around.  Ideally we would like her blood pressure to be on average, 130/80.  Patient notes that her blood pressure did improve with the addition of hydrochlorothiazide.  She will continue on hydrochlorothiazide, her current dose of losartan, and metoprolol.  I gave her a renewal on hydrochlorothiazide and recommended a BMP in 3  months.  Patient should be on a heart healthy low-sodium diet.    Paroxysmal supraventricular tachycardia: Patient has been doing well on low-dose metoprolol.  She is noted no recent episodes.    Aortic valve disease: Echocardiogram from January showed LV systolic function, EF 60%, mild to moderate aortic valve stenosis.  This will continue to be followed serially.  Patient is asymptomatic.    Patient will follow-up with me in early September and we will see Dr. Santos in February.  Patient instructed to call with any cardiovascular complaints. All questions were answered.       Dragon dictation was utilized to create this document. Quite often unanticipated grammatical, syntax,  and other interpretive errors are inadvertently transcribed by the computer software.  Please disregard these errors.  Please excuse any errors that have escaped final proofreading.          Rose Hughes, APRN-CNP

## 2024-04-11 ENCOUNTER — OFFICE VISIT (OUTPATIENT)
Dept: VASCULAR SURGERY | Facility: CLINIC | Age: 79
End: 2024-04-11
Payer: MEDICARE

## 2024-04-11 ENCOUNTER — OFFICE VISIT (OUTPATIENT)
Dept: CARDIOLOGY | Facility: CLINIC | Age: 79
End: 2024-04-11
Payer: MEDICARE

## 2024-04-11 ENCOUNTER — APPOINTMENT (OUTPATIENT)
Dept: CARDIOLOGY | Facility: CLINIC | Age: 79
End: 2024-04-11
Payer: MEDICARE

## 2024-04-11 VITALS
DIASTOLIC BLOOD PRESSURE: 84 MMHG | BODY MASS INDEX: 26.98 KG/M2 | HEART RATE: 65 BPM | WEIGHT: 158 LBS | OXYGEN SATURATION: 97 % | HEIGHT: 64 IN | SYSTOLIC BLOOD PRESSURE: 122 MMHG

## 2024-04-11 VITALS
BODY MASS INDEX: 27.12 KG/M2 | HEART RATE: 57 BPM | WEIGHT: 158 LBS | SYSTOLIC BLOOD PRESSURE: 171 MMHG | DIASTOLIC BLOOD PRESSURE: 74 MMHG

## 2024-04-11 DIAGNOSIS — I10 ESSENTIAL HYPERTENSION: ICD-10-CM

## 2024-04-11 DIAGNOSIS — I25.10 ARTERIOSCLEROSIS OF CORONARY ARTERY: ICD-10-CM

## 2024-04-11 DIAGNOSIS — E78.1 PURE HYPERTRIGLYCERIDEMIA: ICD-10-CM

## 2024-04-11 DIAGNOSIS — I47.10 PAROXYSMAL SUPRAVENTRICULAR TACHYCARDIA (CMS-HCC): ICD-10-CM

## 2024-04-11 DIAGNOSIS — I65.23 BILATERAL CAROTID ARTERY STENOSIS: Primary | ICD-10-CM

## 2024-04-11 DIAGNOSIS — I35.0 AORTIC VALVE STENOSIS, ETIOLOGY OF CARDIAC VALVE DISEASE UNSPECIFIED: Primary | ICD-10-CM

## 2024-04-11 PROCEDURE — 3078F DIAST BP <80 MM HG: CPT | Performed by: SURGERY

## 2024-04-11 PROCEDURE — 1159F MED LIST DOCD IN RCRD: CPT | Performed by: SURGERY

## 2024-04-11 PROCEDURE — 3074F SYST BP LT 130 MM HG: CPT | Performed by: NURSE PRACTITIONER

## 2024-04-11 PROCEDURE — 99214 OFFICE O/P EST MOD 30 MIN: CPT | Performed by: NURSE PRACTITIONER

## 2024-04-11 PROCEDURE — 3077F SYST BP >= 140 MM HG: CPT | Performed by: SURGERY

## 2024-04-11 PROCEDURE — 1157F ADVNC CARE PLAN IN RCRD: CPT | Performed by: NURSE PRACTITIONER

## 2024-04-11 PROCEDURE — 1036F TOBACCO NON-USER: CPT | Performed by: NURSE PRACTITIONER

## 2024-04-11 PROCEDURE — 1123F ACP DISCUSS/DSCN MKR DOCD: CPT | Performed by: SURGERY

## 2024-04-11 PROCEDURE — 1160F RVW MEDS BY RX/DR IN RCRD: CPT | Performed by: SURGERY

## 2024-04-11 PROCEDURE — 99203 OFFICE O/P NEW LOW 30 MIN: CPT | Performed by: SURGERY

## 2024-04-11 PROCEDURE — 1160F RVW MEDS BY RX/DR IN RCRD: CPT | Performed by: NURSE PRACTITIONER

## 2024-04-11 PROCEDURE — 1157F ADVNC CARE PLAN IN RCRD: CPT | Performed by: SURGERY

## 2024-04-11 PROCEDURE — 1036F TOBACCO NON-USER: CPT | Performed by: SURGERY

## 2024-04-11 PROCEDURE — 1123F ACP DISCUSS/DSCN MKR DOCD: CPT | Performed by: NURSE PRACTITIONER

## 2024-04-11 PROCEDURE — 3079F DIAST BP 80-89 MM HG: CPT | Performed by: NURSE PRACTITIONER

## 2024-04-11 PROCEDURE — 1159F MED LIST DOCD IN RCRD: CPT | Performed by: NURSE PRACTITIONER

## 2024-04-11 RX ORDER — HYDROCHLOROTHIAZIDE 12.5 MG/1
12.5 CAPSULE ORAL EVERY MORNING
Qty: 90 CAPSULE | Refills: 2 | Status: SHIPPED | OUTPATIENT
Start: 2024-04-11

## 2024-04-11 ASSESSMENT — ENCOUNTER SYMPTOMS
DYSPNEA ON EXERTION: 0
IRREGULAR HEARTBEAT: 0
RESPIRATORY NEGATIVE: 1
SHORTNESS OF BREATH: 0
COUGH: 0
DECREASED APPETITE: 0
CONSTITUTIONAL NEGATIVE: 1
FALLS: 0
FOCAL WEAKNESS: 0
SYNCOPE: 0
BLURRED VISION: 0
LIGHT-HEADEDNESS: 0
PALPITATIONS: 0
GASTROINTESTINAL NEGATIVE: 1
DEPRESSION: 0
ORTHOPNEA: 0
MEMORY LOSS: 0

## 2024-04-11 NOTE — PATIENT INSTRUCTIONS
Continue on current meds  Heart healthy, low sodium diet  Mediterranean diet is recommended  Continue to monitor BP at home:  Goal is 130/80 on average  BMP in 3mo's   Follow up with me in early September

## 2024-04-11 NOTE — PROGRESS NOTES
Subjective   Patient ID: Nat Thacker is a 79 y.o. female who presents for New Patient Visit (NPV- carotid stenosis).  HPI  Patient is here for evaluation of carotid stenosis  Patient had some cardiac issues subsequently carotid duplex study which revealed 50 to 69% right carotid stenosis minimal disease left.  No evidence of amaurosis hemiplegia or hemiparesis patient does not claudicate  Patient also is complaining of some dyspnea with heavy exertion  She is a previous PTCA    Review of Systems  Review of Systems    Constitutional:  no generalized malaise overall feels well, energy levels intact, no complaints specifically noted  HEENT:  No blurry vision, no visual aides noted, no hearing loss no ear ache no nose bleeds noted, no dysphagia, no congestion otherwise no pertinent positives noted  Cardiovascular:  no palpitations, chest pain or heaviness noted, no leg swelling, no numbness or tingling in the lower extremity noted  Respiratory:  no shortness of breath, no productive cough noted, no conversation dyspnea or difficulty breathing  Gastrointestinal:  no abdominal pain, no nausea or vomiting, appetite intact, no bowel irregularities noted  Genitourinary:   no urinary incontinence, frequency or urgency issues noted, no hematuria or burning sensation issues  Musculoskeletal:  No muscle aches or pains, no joint discomfort noted, no back pain noted otherwise feels well  Skin: no ulcerations, skin color issues or wounds upper or lower extremities  Neurologic: no dizziness, no hemiplegia, no hemiparesis, no obvious visual deficits noted  Psychiatric: no depression, no memory loss noted, no suicidal ideation  Endocrine: no weight loss or gain, no temperature concerns hot or cold intolerance  Hemogolotic/Lymphatic: no bruising, excessive bleeding, no swelling in the groins or neck noted      Objective   Physical Exam  Physical exam    Constitutional: alert and in no acute distress verbal  Eyes: No erythema swelling  or discharge noted  Neck: supple, symmetric, trachea midline, no masses noted  Cardiovascular: Carotid pulses 2+, no obvious bruit, no Jugular distension noted, no thrill, heart regular rate, lower extremity vascular exam intact, cap refill <2 sec  Pulmonary:  Bilateral breath sounds intact, clear with rales rhonchi or wheeze  Abdomen: soft non tender, no pulsatile masses noted, no rebound rigidity or guarding noted  Skin: intact warm no abnormal turgor  Psychiatric: alert without any obvious cognitive issues, oriented to person, place, and time      Assessment/Plan   Carotid stenosis/asymptomatic  Continue aspirin therapy good blood pressure management  Patient also has renal stenosis based on duplex however good blood pressure management on 2 antihypertensives  No evidence of renal dysfunction noted  Follow-up 1 year repeat carotid duplex and renal duplex at that time.         Leno Carey DO 04/11/24 3:31 PM

## 2024-07-29 ENCOUNTER — APPOINTMENT (OUTPATIENT)
Dept: CARDIOLOGY | Facility: CLINIC | Age: 79
End: 2024-07-29
Payer: MEDICARE

## 2024-07-29 ENCOUNTER — LAB (OUTPATIENT)
Dept: LAB | Facility: LAB | Age: 79
End: 2024-07-29
Payer: MEDICARE

## 2024-07-29 DIAGNOSIS — I25.10 ARTERIOSCLEROSIS OF CORONARY ARTERY: ICD-10-CM

## 2024-07-29 DIAGNOSIS — I35.0 AORTIC VALVE STENOSIS, ETIOLOGY OF CARDIAC VALVE DISEASE UNSPECIFIED: ICD-10-CM

## 2024-07-29 LAB
ANION GAP SERPL CALC-SCNC: 10 MMOL/L (ref 10–20)
BUN SERPL-MCNC: 17 MG/DL (ref 6–23)
CALCIUM SERPL-MCNC: 8.9 MG/DL (ref 8.6–10.3)
CHLORIDE SERPL-SCNC: 106 MMOL/L (ref 98–107)
CO2 SERPL-SCNC: 27 MMOL/L (ref 21–32)
CREAT SERPL-MCNC: 1.05 MG/DL (ref 0.5–1.05)
EGFRCR SERPLBLD CKD-EPI 2021: 54 ML/MIN/1.73M*2
GLUCOSE SERPL-MCNC: 105 MG/DL (ref 74–99)
POTASSIUM SERPL-SCNC: 4.2 MMOL/L (ref 3.5–5.3)
SODIUM SERPL-SCNC: 139 MMOL/L (ref 136–145)

## 2024-07-29 PROCEDURE — 36415 COLL VENOUS BLD VENIPUNCTURE: CPT

## 2024-07-29 PROCEDURE — 80048 BASIC METABOLIC PNL TOTAL CA: CPT

## 2024-08-05 ENCOUNTER — APPOINTMENT (OUTPATIENT)
Dept: PRIMARY CARE | Facility: CLINIC | Age: 79
End: 2024-08-05
Payer: MEDICARE

## 2024-08-07 ENCOUNTER — APPOINTMENT (OUTPATIENT)
Dept: PRIMARY CARE | Facility: CLINIC | Age: 79
End: 2024-08-07
Payer: MEDICARE

## 2024-08-07 VITALS
RESPIRATION RATE: 18 BRPM | DIASTOLIC BLOOD PRESSURE: 83 MMHG | WEIGHT: 155 LBS | BODY MASS INDEX: 26.46 KG/M2 | OXYGEN SATURATION: 99 % | HEART RATE: 68 BPM | HEIGHT: 64 IN | SYSTOLIC BLOOD PRESSURE: 168 MMHG | TEMPERATURE: 96.8 F

## 2024-08-07 DIAGNOSIS — R73.03 PREDIABETES: ICD-10-CM

## 2024-08-07 DIAGNOSIS — Z00.00 ROUTINE GENERAL MEDICAL EXAMINATION AT HEALTH CARE FACILITY: Primary | ICD-10-CM

## 2024-08-07 DIAGNOSIS — M54.50 CHRONIC LEFT-SIDED LOW BACK PAIN WITHOUT SCIATICA: ICD-10-CM

## 2024-08-07 DIAGNOSIS — I10 ESSENTIAL HYPERTENSION: ICD-10-CM

## 2024-08-07 DIAGNOSIS — E78.1 PURE HYPERTRIGLYCERIDEMIA: ICD-10-CM

## 2024-08-07 DIAGNOSIS — I25.10 ARTERIOSCLEROSIS OF CORONARY ARTERY: ICD-10-CM

## 2024-08-07 DIAGNOSIS — G89.29 CHRONIC LEFT-SIDED LOW BACK PAIN WITHOUT SCIATICA: ICD-10-CM

## 2024-08-07 PROBLEM — F43.23 ADJUSTMENT DISORDER WITH MIXED ANXIETY AND DEPRESSED MOOD: Status: RESOLVED | Noted: 2024-01-23 | Resolved: 2024-08-07

## 2024-08-07 PROBLEM — I65.21 STENOSIS OF RIGHT CAROTID ARTERY: Status: ACTIVE | Noted: 2024-08-07

## 2024-08-07 PROBLEM — R73.9 HYPERGLYCEMIA: Status: RESOLVED | Noted: 2023-12-29 | Resolved: 2024-08-07

## 2024-08-07 PROBLEM — N60.39 FIBROSCLEROSIS OF BREAST: Status: RESOLVED | Noted: 2024-01-23 | Resolved: 2024-08-07

## 2024-08-07 PROBLEM — B02.9 HERPES ZOSTER: Status: RESOLVED | Noted: 2024-01-23 | Resolved: 2024-08-07

## 2024-08-07 PROBLEM — R94.39 ABNORMAL STRESS TEST: Status: RESOLVED | Noted: 2024-03-11 | Resolved: 2024-08-07

## 2024-08-07 PROBLEM — J30.9 ALLERGIC RHINITIS: Status: RESOLVED | Noted: 2023-05-05 | Resolved: 2024-08-07

## 2024-08-07 PROBLEM — E78.5 HYPERLIPIDEMIA: Status: RESOLVED | Noted: 2023-05-05 | Resolved: 2024-08-07

## 2024-08-07 PROBLEM — R01.1 HEART MURMUR: Status: RESOLVED | Noted: 2023-05-05 | Resolved: 2024-08-07

## 2024-08-07 PROBLEM — L25.9 CONTACT DERMATITIS: Status: RESOLVED | Noted: 2024-01-23 | Resolved: 2024-08-07

## 2024-08-07 PROBLEM — Z85.828 HISTORY OF MALIGNANT NEOPLASM OF SKIN: Status: RESOLVED | Noted: 2024-01-23 | Resolved: 2024-08-07

## 2024-08-07 PROBLEM — R06.09 EXERTIONAL DYSPNEA: Status: RESOLVED | Noted: 2024-01-24 | Resolved: 2024-08-07

## 2024-08-07 PROBLEM — D64.9 ANEMIA: Status: RESOLVED | Noted: 2023-05-05 | Resolved: 2024-08-07

## 2024-08-07 PROCEDURE — G0439 PPPS, SUBSEQ VISIT: HCPCS | Performed by: FAMILY MEDICINE

## 2024-08-07 PROCEDURE — 1159F MED LIST DOCD IN RCRD: CPT | Performed by: FAMILY MEDICINE

## 2024-08-07 PROCEDURE — 1160F RVW MEDS BY RX/DR IN RCRD: CPT | Performed by: FAMILY MEDICINE

## 2024-08-07 PROCEDURE — 3079F DIAST BP 80-89 MM HG: CPT | Performed by: FAMILY MEDICINE

## 2024-08-07 PROCEDURE — 3077F SYST BP >= 140 MM HG: CPT | Performed by: FAMILY MEDICINE

## 2024-08-07 PROCEDURE — 1036F TOBACCO NON-USER: CPT | Performed by: FAMILY MEDICINE

## 2024-08-07 PROCEDURE — 1125F AMNT PAIN NOTED PAIN PRSNT: CPT | Performed by: FAMILY MEDICINE

## 2024-08-07 PROCEDURE — 1157F ADVNC CARE PLAN IN RCRD: CPT | Performed by: FAMILY MEDICINE

## 2024-08-07 PROCEDURE — 1123F ACP DISCUSS/DSCN MKR DOCD: CPT | Performed by: FAMILY MEDICINE

## 2024-08-07 PROCEDURE — 1170F FXNL STATUS ASSESSED: CPT | Performed by: FAMILY MEDICINE

## 2024-08-07 RX ORDER — LOSARTAN POTASSIUM 50 MG/1
50 TABLET ORAL 2 TIMES DAILY
Qty: 180 TABLET | Refills: 3 | Status: SHIPPED | OUTPATIENT
Start: 2024-08-07 | End: 2025-08-07

## 2024-08-07 RX ORDER — METOPROLOL TARTRATE 25 MG/1
12.5 TABLET, FILM COATED ORAL 2 TIMES DAILY
Qty: 90 TABLET | Refills: 1 | Status: CANCELLED | OUTPATIENT
Start: 2024-08-07 | End: 2025-02-03

## 2024-08-07 RX ORDER — ACETAMINOPHEN AND CODEINE PHOSPHATE 300; 30 MG/1; MG/1
1 TABLET ORAL EVERY 6 HOURS PRN
Qty: 24 TABLET | Refills: 0 | Status: SHIPPED | OUTPATIENT
Start: 2024-08-07 | End: 2024-08-13

## 2024-08-07 RX ORDER — ATORVASTATIN CALCIUM 20 MG/1
20 TABLET, FILM COATED ORAL NIGHTLY
Qty: 90 TABLET | Refills: 1 | Status: CANCELLED | OUTPATIENT
Start: 2024-08-07 | End: 2025-02-03

## 2024-08-07 RX ORDER — METOPROLOL TARTRATE 25 MG/1
12.5 TABLET, FILM COATED ORAL 2 TIMES DAILY
Qty: 90 TABLET | Refills: 3 | Status: SHIPPED | OUTPATIENT
Start: 2024-08-07 | End: 2025-08-02

## 2024-08-07 SDOH — ECONOMIC STABILITY: FOOD INSECURITY: WITHIN THE PAST 12 MONTHS, THE FOOD YOU BOUGHT JUST DIDN'T LAST AND YOU DIDN'T HAVE MONEY TO GET MORE.: NEVER TRUE

## 2024-08-07 SDOH — ECONOMIC STABILITY: FOOD INSECURITY: WITHIN THE PAST 12 MONTHS, YOU WORRIED THAT YOUR FOOD WOULD RUN OUT BEFORE YOU GOT MONEY TO BUY MORE.: NEVER TRUE

## 2024-08-07 ASSESSMENT — LIFESTYLE VARIABLES
HOW OFTEN DO YOU HAVE A DRINK CONTAINING ALCOHOL: MONTHLY OR LESS
AUDIT-C TOTAL SCORE: 1
HOW OFTEN DO YOU HAVE SIX OR MORE DRINKS ON ONE OCCASION: NEVER
SKIP TO QUESTIONS 9-10: 1
HOW MANY STANDARD DRINKS CONTAINING ALCOHOL DO YOU HAVE ON A TYPICAL DAY: 1 OR 2

## 2024-08-07 ASSESSMENT — ENCOUNTER SYMPTOMS
OCCASIONAL FEELINGS OF UNSTEADINESS: 0
LOSS OF SENSATION IN FEET: 0
DEPRESSION: 0

## 2024-08-07 ASSESSMENT — ACTIVITIES OF DAILY LIVING (ADL)
BATHING: INDEPENDENT
GROCERY_SHOPPING: INDEPENDENT
MANAGING_FINANCES: INDEPENDENT
DOING_HOUSEWORK: INDEPENDENT
TAKING_MEDICATION: INDEPENDENT
DRESSING: INDEPENDENT

## 2024-08-07 ASSESSMENT — PAIN SCALES - GENERAL: PAINLEVEL: 4

## 2024-08-07 ASSESSMENT — PATIENT HEALTH QUESTIONNAIRE - PHQ9
SUM OF ALL RESPONSES TO PHQ9 QUESTIONS 1 & 2: 0
1. LITTLE INTEREST OR PLEASURE IN DOING THINGS: NOT AT ALL
2. FEELING DOWN, DEPRESSED OR HOPELESS: NOT AT ALL

## 2024-08-07 NOTE — PROGRESS NOTES
"Subjective   Patient ID: Nat Thakcer is a 79 y.o. female who presents for Medicare Annual Wellness Visit Subsequent.  She is concerned about bilateral hand numbness which occurs at night.     She has chronic low back pain (mostly on the left) and left knee pain.         In addition to that documented in the HPI above, the additional ROS was obtained:  Constitutional: Denies fevers or chills  Eyes: Denies vision changes  ENMT: Denies trouble swallowing  Cardiovascular: Denies chest pain or heart racing  Respiratory: Denies SOB or cough  Gastrointestinal: Denies nausea, vomiting, diarrhea or constipation  Neuro: denies frequent headaches or dizziness      Patient Care Team:  Nancy Lao DO as PCP - General  Nancy Lao DO as PCP - [employer] PCP  Olivia Santos MD as Consulting Physician (Cardiology)  Lorna Wu MD as Surgeon (Colon and Rectal Surgery)  Leno Carey DO as Consulting Physician (Vascular Surgery)    Current Outpatient Medications   Medication Sig Dispense Refill    aspirin 81 mg EC tablet Take by mouth.      atorvastatin (Lipitor) 20 mg tablet Take 1 tablet (20 mg) by mouth once daily at bedtime. 90 tablet 1    multivitamin tablet Take 1 tablet by mouth once daily.      acetaminophen-codeine (Tylenol w/ Codeine #3) 300-30 mg tablet Take 1 tablet by mouth every 6 hours if needed for severe pain (7 - 10) for up to 6 days. 24 tablet 0    losartan (Cozaar) 50 mg tablet Take 1 tablet (50 mg) by mouth 2 times a day. 180 tablet 3    metoprolol tartrate (Lopressor) 25 mg tablet Take 0.5 tablets (12.5 mg) by mouth 2 times a day. 90 tablet 3     No current facility-administered medications for this visit.       Objective     Visit Vitals  /83 (BP Location: Left arm, Patient Position: Sitting, BP Cuff Size: Adult)   Pulse 68   Temp 36 °C (96.8 °F) (Temporal)   Resp 18   Ht 1.626 m (5' 4\")   Wt 70.3 kg (155 lb)   SpO2 99%   BMI 26.61 kg/m²   Smoking Status " Former   BSA 1.78 m²        Constitutional: Well nourished, well developed, appears stated age  Eyes: no scleral icterus, no conjunctival injection  Ears: normal external auditory canal, no retraction or bulging of tympanic membranes  Neck: no thyromegaly  Cardiovascular: regular rate and rhythm, systolic heart murmur,  Respiratory: normal respiratory effort, clear to auscultation bilaterally  Musculoskeletal: No gross deformities appreciated  Skin: Warm, dry. No rashes  Neurologic: Alert, CNs II-XII grossly intact..  Psych: Appropriate mood and affect.        Assessment/Plan   Problem List Items Addressed This Visit       Arteriosclerosis of coronary artery    Relevant Medications    losartan (Cozaar) 50 mg tablet    metoprolol tartrate (Lopressor) 25 mg tablet    Essential hypertension (Chronic)     Patient monitors at home  Stopped hydrochlorothiazide due to no improvement with the medication   Has follow up with cardiology          Relevant Medications    losartan (Cozaar) 50 mg tablet    metoprolol tartrate (Lopressor) 25 mg tablet    Other Relevant Orders    CBC    Albumin-Creatinine Ratio, Urine Random    Comprehensive Metabolic Panel    Pure hypertriglyceridemia    Relevant Orders    Lipid Panel    Chronic low back pain     Flairs up intermittently  Rx tylenol with codeine #24 tabs provided  CSA on file. UDS not indicated for short-term rx (usually needs rx twice a year)         Relevant Medications    acetaminophen-codeine (Tylenol w/ Codeine #3) 300-30 mg tablet    Prediabetes    Relevant Orders    Hemoglobin A1C     Other Visit Diagnoses       Routine general medical examination at health care facility    -  Primary    plans on getting flu and covid-19 immunizations at pharmacy in September  yearly labs ordered          All pertinent lab work and results were reviewed with patient.     Follow up 6 months. Celebrating 31st wedding anniversary today.    Nancy Lao DO

## 2024-08-07 NOTE — ASSESSMENT & PLAN NOTE
Patient monitors at home  Stopped hydrochlorothiazide due to no improvement with the medication   Has follow up with cardiology

## 2024-08-07 NOTE — PATIENT INSTRUCTIONS
Please fast for 8 hours for the blood work. Water and black coffee is fine. You do not need an appointment to have your labs done.     Thank you for choosing [facility] for your healthcare.   As always if you have any questions or concerns please do not hesitate to call our office at [phone] or through [facility].    Have a great day!  Nancy Lao, DO

## 2024-09-09 ENCOUNTER — APPOINTMENT (OUTPATIENT)
Dept: CARDIOLOGY | Facility: CLINIC | Age: 79
End: 2024-09-09
Payer: MEDICARE

## 2024-10-14 ENCOUNTER — APPOINTMENT (OUTPATIENT)
Dept: CARDIOLOGY | Facility: HOSPITAL | Age: 79
End: 2024-10-14
Payer: MEDICARE

## 2024-11-11 ENCOUNTER — APPOINTMENT (OUTPATIENT)
Dept: CARDIOLOGY | Facility: HOSPITAL | Age: 79
End: 2024-11-11
Payer: MEDICARE

## 2024-11-18 ENCOUNTER — APPOINTMENT (OUTPATIENT)
Dept: CARDIOLOGY | Facility: HOSPITAL | Age: 79
End: 2024-11-18
Payer: MEDICARE

## 2024-11-20 ASSESSMENT — ENCOUNTER SYMPTOMS
DEPRESSION: 0
DECREASED APPETITE: 0
SHORTNESS OF BREATH: 0
DYSPNEA ON EXERTION: 0
FOCAL WEAKNESS: 0
BLURRED VISION: 0
COUGH: 0
GASTROINTESTINAL NEGATIVE: 1
CONSTITUTIONAL NEGATIVE: 1
PALPITATIONS: 0
LIGHT-HEADEDNESS: 0
MEMORY LOSS: 0
SYNCOPE: 0
ORTHOPNEA: 0
FALLS: 0
IRREGULAR HEARTBEAT: 0
RESPIRATORY NEGATIVE: 1

## 2024-11-20 NOTE — PROGRESS NOTES
Chief Complaint/Reason for Visit:   Patient is coming in today as a 6 month Cardiovascular follow up.     History Of Present Illness:    Ms Thacker is coming in today as a 6-month cardiovascular follow-up.  We have followed this patient previously for coronary artery disease with prior PCI to the LCx, repeat heart catheterization in March of this year showed patent LCx stent right heart cath showed normal filling pressures and preserved cardiac output/index.  We also follow her for paroxysmal supraventricular tachycardia, hypertension, and aortic valve stenosis.    Patient comes in today feeling well.  She has had no chest pain, pressure, palpitations, orthopnea, or edema.  She has not had any significant lightheadedness.  She reports that her home blood pressures are generally in the 140s systolic.  Somewhere along the way, she discontinued hydrochlorothiazide.  She felt it was not needed.  She is taking her other medications as prescribed.    Past Medical History:  She has a past medical history of Abnormal result of other cardiovascular function study (02/14/2020), Adjustment disorder with mixed anxiety and depressed mood (01/23/2024), Anemia (05/05/2023), Colostomy status (Multi) (11/23/2020), Encounter for other preprocedural examination (08/20/2020), Encounter for preprocedural cardiovascular examination (11/19/2020), Fibrosclerosis of breast (01/23/2024), Fibrosclerosis of unspecified breast, Fistula of intestine (01/08/2021), Nausea (10/05/2020), Other long term (current) drug therapy (07/16/2020), Other symptoms and signs involving the genitourinary system (10/09/2020), Personal history of diseases of the skin and subcutaneous tissue (06/30/2021), Personal history of other diseases of the circulatory system (08/13/2019), Personal history of other diseases of the digestive system, Personal history of other diseases of the digestive system (12/16/2020), Personal history of other diseases of the digestive system  (12/15/2020), Personal history of other infectious and parasitic diseases (06/30/2021), Personal history of other malignant neoplasm of skin, Personal history of urinary (tract) infections (10/13/2020), and Unspecified abdominal pain (10/05/2020).    Past Surgical History:  She has a past surgical history that includes Other surgical history (02/14/2019); Other surgical history (02/14/2019); Other surgical history (02/14/2019); Other surgical history (08/13/2019); Other surgical history (08/13/2019); Other surgical history (02/19/2020); and Cardiac catheterization (N/A, 3/22/2024).      Social History:  She reports that she has quit smoking. Her smoking use included cigarettes. She has never been exposed to tobacco smoke. She has never used smokeless tobacco. She reports current alcohol use. She reports that she does not use drugs.    Family History:  Family History   Problem Relation Name Age of Onset    Kidney disease Father      Heart disease Father      Hypertension Other          Allergies:  Adhesive tape-silicones, Ciprofloxacin, Latex, Lisinopril, and Sulfa (sulfonamide antibiotics)    Medications:  Current Outpatient Medications   Medication Instructions    aspirin 81 mg EC tablet Take by mouth.    atorvastatin (LIPITOR) 20 mg, oral, Nightly    hydroCHLOROthiazide (MICROZIDE) 12.5 mg, oral, Daily    losartan (COZAAR) 50 mg, oral, 2 times daily    metoprolol tartrate (LOPRESSOR) 12.5 mg, oral, 2 times daily    multivitamin tablet 1 tablet, Daily       Review of Systems:  Review of Systems   Constitutional: Negative. Negative for decreased appetite and malaise/fatigue.   HENT: Negative.     Eyes:  Negative for blurred vision and visual disturbance.   Cardiovascular:  Negative for chest pain, dyspnea on exertion, irregular heartbeat, leg swelling, orthopnea, palpitations and syncope.   Respiratory: Negative.  Negative for cough and shortness of breath.    Musculoskeletal:  Negative for arthritis and falls.  "  Gastrointestinal: Negative.    Neurological:  Negative for focal weakness and light-headedness.   Psychiatric/Behavioral:  Negative for depression and memory loss.         Vitals  Visit Vitals  /70 (BP Location: Right arm, Patient Position: Sitting, BP Cuff Size: Adult)   Pulse 58   Ht 1.626 m (5' 4\")   Wt 68.9 kg (152 lb)   SpO2 95%   BMI 26.09 kg/m²   Smoking Status Former   BSA 1.76 m²        Physical Exam:  Constitutional:       General: She is not in acute distress.     Appearance: Normal appearance.   HENT:      Mouth/Throat:      Mouth: Mucous membranes are moist.   Neck:      Comments: Bilateral carotid bruits  Cardiovascular:      Rate and Rhythm: Normal rate and regular rhythm.      Heart sounds: Murmur (Systolic murmur noted at the base) heard.   Pulmonary:      Effort: Pulmonary effort is normal.      Breath sounds: Normal breath sounds.   Abdominal:      General: Bowel sounds are normal.      Palpations: Abdomen is soft.      Tenderness: There is no abdominal tenderness.   Musculoskeletal:         General: No swelling.      Right lower leg: No edema.      Left lower leg: No edema.   Patient reports that her femoral sites have healed.  Skin:     General: Skin is warm and dry.   Neurological:      Mental Status: She is alert and oriented to person, place, and time.   Psychiatric:         Behavior: Behavior is cooperative.      Last Labs:  CBC -  Lab Results   Component Value Date    WBC 8.4 03/20/2024    HGB 13.2 03/20/2024    HCT 41.9 03/20/2024    MCV 90 03/20/2024     03/20/2024     Lab Results   Component Value Date    GLUCOSE 105 (H) 07/29/2024    CALCIUM 8.9 07/29/2024     07/29/2024    K 4.2 07/29/2024    CO2 27 07/29/2024     07/29/2024    BUN 17 07/29/2024    CREATININE 1.05 07/29/2024      CMP -  Lab Results   Component Value Date    CALCIUM 8.9 07/29/2024    PHOS 5.2 (H) 09/10/2020    PROT 6.5 12/06/2023    ALBUMIN 4.0 12/06/2023    AST 22 12/06/2023    ALT 23 " 12/06/2023    ALKPHOS 72 12/06/2023    BILITOT 0.4 12/06/2023       LIPID PANEL -   Lab Results   Component Value Date    CHOL 162 12/06/2023    TRIG 124 12/06/2023    HDL 66.8 12/06/2023    CHHDL 2.4 12/06/2023    LDLF 79 01/27/2023    VLDL 25 12/06/2023    NHDL 95 12/06/2023       Lab Results   Component Value Date    HGBA1C 5.9 (H) 12/29/2023       Last Cardiology Tests:  Echo:  Transthoracic Echo (TTE) Complete 01/15/2024--CONCLUSIONS:   1. Left ventricular systolic function is normal with a 60% estimated ejection fraction.   2. Mild to moderate aortic valve stenosis.     Stress Test:  Nuclear Stress Test 02/21/2024--IMPRESSION:  Normal exercise stress myocardial perfusion imaging at level of  stress achieved. Poor exercise tolerance.    Cath:3-22-24  CONCLUSIONS:   1. Left heart cath revealed non obstructive coronary artery disease with patent LCX stent.   2. RHC revealed normal filling pressure with preserved cardiac output and index.   3. Successful 6Fr VIP angioseal deployement in right CFA.       Lab review: I have personally reviewed the laboratory result(s)     Assessment/Plan:  Hypertension: Patient's blood pressure today is suboptimally controlled she is 140/70.  She tells me her home blood pressures are generally elevated as well.  Patient will continue on losartan 50 mg twice daily and metoprolol titrate 12.5 mg twice daily.  I am adding hydrochlorothiazide 12.5 mg daily.  She tolerated this well in the past.  She is due for lab work already in February which should be acceptable.  Ideally our blood pressure goal for this patient is less than 130/80.    ASHD: Patient had PCI to the LCx in 2020.  Heart catheterization  earlier this year demonstrated nonobstructive coronary artery disease, stent to the LCx was patent.  Patient is angina class 0.  She will continue on a low-dose aspirin, atorvastatin, metoprolol, and losartan.  I encouraged her to be on a heart healthy low-sodium diet.    Aortic valve  disease: Echocardiogram from January, 2024 showed mild to moderate aortic valve stenosis.  I recommended a repeat echocardiogram in early 2025 for serial monitoring of AS.  I also recommended tighter blood pressure control.    Paroxysmal supraventricular tachycardia: Patient has not been having any palpitations or tachycardic episodes.  She should continue on low-dose metoprolol.  Her heart rate generally is in the 50s.    Patient will get the echocardiogram scheduled for February.  She will follow-up with Dr. Santos in 6 months.  Patient instructed to call with any cardiovascular complaints. All questions were answered.       Dragon dictation was utilized to create this document. Quite often unanticipated grammatical, syntax,  and other interpretive errors are inadvertently transcribed by the computer software.  Please disregard these errors.  Please excuse any errors that have escaped final proofreading.          Rose Hughes, APRN-CNP

## 2024-11-25 ENCOUNTER — APPOINTMENT (OUTPATIENT)
Dept: CARDIOLOGY | Facility: HOSPITAL | Age: 79
End: 2024-11-25
Payer: MEDICARE

## 2024-11-25 VITALS
BODY MASS INDEX: 25.95 KG/M2 | HEART RATE: 58 BPM | WEIGHT: 152 LBS | DIASTOLIC BLOOD PRESSURE: 70 MMHG | HEIGHT: 64 IN | SYSTOLIC BLOOD PRESSURE: 140 MMHG | OXYGEN SATURATION: 95 %

## 2024-11-25 DIAGNOSIS — E78.5 HYPERLIPIDEMIA, UNSPECIFIED HYPERLIPIDEMIA TYPE: ICD-10-CM

## 2024-11-25 DIAGNOSIS — Z98.61 STATUS POST PERCUTANEOUS TRANSLUMINAL CORONARY ANGIOPLASTY: ICD-10-CM

## 2024-11-25 DIAGNOSIS — I35.0 AORTIC VALVE STENOSIS, ETIOLOGY OF CARDIAC VALVE DISEASE UNSPECIFIED: ICD-10-CM

## 2024-11-25 DIAGNOSIS — I47.10 PAROXYSMAL SUPRAVENTRICULAR TACHYCARDIA (CMS-HCC): Primary | ICD-10-CM

## 2024-11-25 DIAGNOSIS — I25.10 ARTERIOSCLEROSIS OF CORONARY ARTERY: ICD-10-CM

## 2024-11-25 DIAGNOSIS — I10 ESSENTIAL HYPERTENSION: ICD-10-CM

## 2024-11-25 DIAGNOSIS — I47.20 WIDE QRS VENTRICULAR TACHYCARDIA (MULTI): ICD-10-CM

## 2024-11-25 DIAGNOSIS — Z86.79 HISTORY OF PAROXYSMAL SUPRAVENTRICULAR TACHYCARDIA: ICD-10-CM

## 2024-11-25 DIAGNOSIS — R06.09 EXERTIONAL DYSPNEA: ICD-10-CM

## 2024-11-25 PROCEDURE — 99214 OFFICE O/P EST MOD 30 MIN: CPT | Performed by: NURSE PRACTITIONER

## 2024-11-25 PROCEDURE — 1157F ADVNC CARE PLAN IN RCRD: CPT | Performed by: NURSE PRACTITIONER

## 2024-11-25 PROCEDURE — 1123F ACP DISCUSS/DSCN MKR DOCD: CPT | Performed by: NURSE PRACTITIONER

## 2024-11-25 PROCEDURE — 1036F TOBACCO NON-USER: CPT | Performed by: NURSE PRACTITIONER

## 2024-11-25 PROCEDURE — 3078F DIAST BP <80 MM HG: CPT | Performed by: NURSE PRACTITIONER

## 2024-11-25 PROCEDURE — 3077F SYST BP >= 140 MM HG: CPT | Performed by: NURSE PRACTITIONER

## 2024-11-25 PROCEDURE — 1159F MED LIST DOCD IN RCRD: CPT | Performed by: NURSE PRACTITIONER

## 2024-11-25 RX ORDER — HYDROCHLOROTHIAZIDE 12.5 MG/1
12.5 CAPSULE ORAL DAILY
COMMUNITY

## 2024-11-25 RX ORDER — LOSARTAN POTASSIUM 50 MG/1
50 TABLET ORAL 2 TIMES DAILY
Qty: 180 TABLET | Refills: 3 | Status: SHIPPED | OUTPATIENT
Start: 2024-11-25 | End: 2025-11-25

## 2024-11-25 RX ORDER — METOPROLOL TARTRATE 25 MG/1
12.5 TABLET, FILM COATED ORAL 2 TIMES DAILY
Qty: 90 TABLET | Refills: 3 | Status: SHIPPED | OUTPATIENT
Start: 2024-11-25 | End: 2025-11-20

## 2024-11-25 RX ORDER — ATORVASTATIN CALCIUM 20 MG/1
20 TABLET, FILM COATED ORAL NIGHTLY
Qty: 90 TABLET | Refills: 3 | Status: SHIPPED | OUTPATIENT
Start: 2024-11-25 | End: 2025-11-20

## 2024-11-25 NOTE — PATIENT INSTRUCTIONS
Add back: hydrochlorothiazide 12.5 mg daily  Heart healthy, low sodium diet  Mediterranean diet is recommended  Fasting labs due in February   Echo in February  Follow up with Dr Santos in 6 months

## 2025-02-03 ENCOUNTER — TELEPHONE (OUTPATIENT)
Dept: CARDIOLOGY | Facility: HOSPITAL | Age: 80
End: 2025-02-03
Payer: MEDICARE

## 2025-02-03 DIAGNOSIS — I10 ESSENTIAL HYPERTENSION: Primary | ICD-10-CM

## 2025-02-03 RX ORDER — HYDROCHLOROTHIAZIDE 12.5 MG/1
12.5 CAPSULE ORAL DAILY
Qty: 90 CAPSULE | Refills: 0 | Status: SHIPPED | OUTPATIENT
Start: 2025-02-03

## 2025-02-03 NOTE — TELEPHONE ENCOUNTER
Pt LVM for refill of hydroCHLOROthiazide (Microzide) 12.5 mg capsule to     [facility] [address] [facility]  [facility] [address] [place] [address]  Phone: [phone]  Fax: [phone]

## 2025-02-05 ENCOUNTER — APPOINTMENT (OUTPATIENT)
Dept: CARDIOLOGY | Facility: CLINIC | Age: 80
End: 2025-02-05
Payer: MEDICARE

## 2025-02-08 NOTE — PATIENT INSTRUCTIONS
Thank you for choosing [facility] for your healthcare.   As always if you have any questions or concerns please do not hesitate to call our office at [phone] or through [employer].    Have a great day!  Nancy Lao, DO

## 2025-02-10 ENCOUNTER — APPOINTMENT (OUTPATIENT)
Dept: PRIMARY CARE | Facility: CLINIC | Age: 80
End: 2025-02-10
Payer: MEDICARE

## 2025-02-10 VITALS
DIASTOLIC BLOOD PRESSURE: 84 MMHG | HEIGHT: 64 IN | HEART RATE: 78 BPM | SYSTOLIC BLOOD PRESSURE: 138 MMHG | OXYGEN SATURATION: 99 % | WEIGHT: 150 LBS | BODY MASS INDEX: 25.61 KG/M2 | TEMPERATURE: 96.8 F | RESPIRATION RATE: 20 BRPM

## 2025-02-10 DIAGNOSIS — M54.50 CHRONIC LEFT-SIDED LOW BACK PAIN WITHOUT SCIATICA: ICD-10-CM

## 2025-02-10 DIAGNOSIS — I10 ESSENTIAL HYPERTENSION: Chronic | ICD-10-CM

## 2025-02-10 DIAGNOSIS — G89.29 CHRONIC LEFT-SIDED LOW BACK PAIN WITHOUT SCIATICA: ICD-10-CM

## 2025-02-10 DIAGNOSIS — G56.03 BILATERAL CARPAL TUNNEL SYNDROME: ICD-10-CM

## 2025-02-10 DIAGNOSIS — Z00.00 ROUTINE GENERAL MEDICAL EXAMINATION AT HEALTH CARE FACILITY: Primary | ICD-10-CM

## 2025-02-10 DIAGNOSIS — N18.31 CHRONIC KIDNEY DISEASE, STAGE 3A (MULTI): ICD-10-CM

## 2025-02-10 PROBLEM — Z93.3 STATUS POST COLOSTOMY (MULTI): Status: RESOLVED | Noted: 2024-01-23 | Resolved: 2025-02-10

## 2025-02-10 PROBLEM — M79.2 NEURALGIA: Status: RESOLVED | Noted: 2023-05-05 | Resolved: 2025-02-10

## 2025-02-10 PROCEDURE — 3079F DIAST BP 80-89 MM HG: CPT | Performed by: FAMILY MEDICINE

## 2025-02-10 PROCEDURE — 1159F MED LIST DOCD IN RCRD: CPT | Performed by: FAMILY MEDICINE

## 2025-02-10 PROCEDURE — 1157F ADVNC CARE PLAN IN RCRD: CPT | Performed by: FAMILY MEDICINE

## 2025-02-10 PROCEDURE — 1160F RVW MEDS BY RX/DR IN RCRD: CPT | Performed by: FAMILY MEDICINE

## 2025-02-10 PROCEDURE — 1036F TOBACCO NON-USER: CPT | Performed by: FAMILY MEDICINE

## 2025-02-10 PROCEDURE — 3075F SYST BP GE 130 - 139MM HG: CPT | Performed by: FAMILY MEDICINE

## 2025-02-10 PROCEDURE — G0439 PPPS, SUBSEQ VISIT: HCPCS | Performed by: FAMILY MEDICINE

## 2025-02-10 PROCEDURE — 1170F FXNL STATUS ASSESSED: CPT | Performed by: FAMILY MEDICINE

## 2025-02-10 PROCEDURE — 1126F AMNT PAIN NOTED NONE PRSNT: CPT | Performed by: FAMILY MEDICINE

## 2025-02-10 PROCEDURE — G2211 COMPLEX E/M VISIT ADD ON: HCPCS | Performed by: FAMILY MEDICINE

## 2025-02-10 PROCEDURE — 1123F ACP DISCUSS/DSCN MKR DOCD: CPT | Performed by: FAMILY MEDICINE

## 2025-02-10 RX ORDER — ACETAMINOPHEN AND CODEINE PHOSPHATE 300; 30 MG/1; MG/1
1 TABLET ORAL EVERY 6 HOURS PRN
Qty: 24 TABLET | Refills: 0 | Status: SHIPPED | OUTPATIENT
Start: 2025-02-10 | End: 2025-02-16

## 2025-02-10 SDOH — ECONOMIC STABILITY: FOOD INSECURITY: WITHIN THE PAST 12 MONTHS, YOU WORRIED THAT YOUR FOOD WOULD RUN OUT BEFORE YOU GOT MONEY TO BUY MORE.: NEVER TRUE

## 2025-02-10 SDOH — ECONOMIC STABILITY: FOOD INSECURITY: WITHIN THE PAST 12 MONTHS, THE FOOD YOU BOUGHT JUST DIDN'T LAST AND YOU DIDN'T HAVE MONEY TO GET MORE.: NEVER TRUE

## 2025-02-10 ASSESSMENT — ENCOUNTER SYMPTOMS
OCCASIONAL FEELINGS OF UNSTEADINESS: 0
LOSS OF SENSATION IN FEET: 0
DEPRESSION: 0

## 2025-02-10 ASSESSMENT — PATIENT HEALTH QUESTIONNAIRE - PHQ9
SUM OF ALL RESPONSES TO PHQ9 QUESTIONS 1 & 2: 0
2. FEELING DOWN, DEPRESSED OR HOPELESS: NOT AT ALL
1. LITTLE INTEREST OR PLEASURE IN DOING THINGS: NOT AT ALL

## 2025-02-10 ASSESSMENT — LIFESTYLE VARIABLES
SKIP TO QUESTIONS 9-10: 1
HOW MANY STANDARD DRINKS CONTAINING ALCOHOL DO YOU HAVE ON A TYPICAL DAY: 1 OR 2
AUDIT-C TOTAL SCORE: 1
HOW OFTEN DO YOU HAVE SIX OR MORE DRINKS ON ONE OCCASION: NEVER
HOW OFTEN DO YOU HAVE A DRINK CONTAINING ALCOHOL: MONTHLY OR LESS

## 2025-02-10 ASSESSMENT — ACTIVITIES OF DAILY LIVING (ADL)
DRESSING: INDEPENDENT
TAKING_MEDICATION: INDEPENDENT
BATHING: INDEPENDENT
GROCERY_SHOPPING: INDEPENDENT
MANAGING_FINANCES: INDEPENDENT
DOING_HOUSEWORK: INDEPENDENT

## 2025-02-10 ASSESSMENT — PAIN SCALES - GENERAL: PAINLEVEL_OUTOF10: 0-NO PAIN

## 2025-02-10 NOTE — ASSESSMENT & PLAN NOTE
Suspected  Discussed bracing at night to see if that helps. She will call if she would like referral to ortho.

## 2025-02-10 NOTE — ASSESSMENT & PLAN NOTE
Orders:    acetaminophen-codeine (Tylenol w/ Codeine #3) 300-30 mg tablet; Take 1 tablet by mouth every 6 hours if needed for severe pain (7 - 10) for up to 6 days.

## 2025-02-10 NOTE — PROGRESS NOTES
"Subjective   Reason for Visit: Nat Thacker is an 79 y.o. female here for a Medicare Wellness visit.     Past Medical, Surgical, and Family History reviewed and updated in chart.    Reviewed all medications by prescribing practitioner or clinical pharmacist (such as prescriptions, OTCs, herbal therapies and supplements) and documented in the medical record.    She continues to have issues with bilateral hand numbness that occurs when driving and sometimes wakes her up from sleep.    She had a skin biopsy done by dermatology in December that required stitches.  She feels that a stitch may have been left in behind her right knee.        Patient Care Team:  Nancy Lao DO as PCP - General  Nancy Lao DO as PCP - [employer] PCP  Olivia Santos MD as Consulting Physician (Cardiology)  Lorna Wu MD as Surgeon (Colon and Rectal Surgery)  Leno Carey DO as Consulting Physician (Vascular Surgery)     Review of Systems  In addition to that documented in the HPI above, the additional ROS was obtained:  Constitutional: Denies fevers or chills  Eyes: Denies vision changes  ENMT: Denies trouble swallowing  Cardiovascular: Denies chest pain or heart racing  Respiratory: Denies SOB or cough        Objective   Vitals:  /84 (BP Location: Left arm, Patient Position: Sitting, BP Cuff Size: Large adult)   Pulse 78   Temp 36 °C (96.8 °F) (Temporal)   Resp 20   Ht 1.626 m (5' 4\")   Wt 68 kg (150 lb)   SpO2 99%   BMI 25.75 kg/m²       Physical Exam  Constitutional: Well nourished, well developed, appears stated age  Eyes: no scleral icterus, no conjunctival injection  Ears: normal external auditory canal, no retraction or bulging of tympanic membranes  Neck: no thyromegaly  Cardiovascular: regular rate and rhythm, systolic heart murmur, no leg edema  Respiratory: normal respiratory effort, clear to auscultation bilaterally  Musculoskeletal: No gross deformities " appreciated  Skin: Warm, dry. Clear suture removed from behind right knee  Neurologic: Alert, CNs II-XII grossly intact..  Psych: Appropriate mood and affect.    Assessment & Plan  Routine general medical examination at health care facility         Chronic kidney disease, stage 3a (Multi)  Reminded patient to have labs done        Essential hypertension  Patient monitors at home  Controlled on metoprolol, losartan, and HCTZ         Chronic left-sided low back pain without sciatica    Orders:    acetaminophen-codeine (Tylenol w/ Codeine #3) 300-30 mg tablet; Take 1 tablet by mouth every 6 hours if needed for severe pain (7 - 10) for up to 6 days.    Bilateral carpal tunnel syndrome  Suspected  Discussed bracing at night to see if that helps. She will call if she would like referral to ortho.          Follow up 6 months.   Nancy Lao, DO

## 2025-02-14 LAB
ALBUMIN SERPL-MCNC: 3.9 G/DL (ref 3.6–5.1)
ALP SERPL-CCNC: 76 U/L (ref 37–153)
ALT SERPL-CCNC: 15 U/L (ref 6–29)
ANION GAP SERPL CALCULATED.4IONS-SCNC: 9 MMOL/L (CALC) (ref 7–17)
AST SERPL-CCNC: 15 U/L (ref 10–35)
BILIRUB SERPL-MCNC: 0.3 MG/DL (ref 0.2–1.2)
BUN SERPL-MCNC: 25 MG/DL (ref 7–25)
CALCIUM SERPL-MCNC: 9.1 MG/DL (ref 8.6–10.4)
CHLORIDE SERPL-SCNC: 105 MMOL/L (ref 98–110)
CHOLEST SERPL-MCNC: 156 MG/DL
CHOLEST/HDLC SERPL: 3 (CALC)
CO2 SERPL-SCNC: 26 MMOL/L (ref 20–32)
CREAT SERPL-MCNC: 1.14 MG/DL (ref 0.6–0.95)
EGFRCR SERPLBLD CKD-EPI 2021: 49 ML/MIN/1.73M2
ERYTHROCYTE [DISTWIDTH] IN BLOOD BY AUTOMATED COUNT: 13.2 % (ref 11–15)
EST. AVERAGE GLUCOSE BLD GHB EST-MCNC: 128 MG/DL
EST. AVERAGE GLUCOSE BLD GHB EST-SCNC: 7.1 MMOL/L
GLUCOSE SERPL-MCNC: 105 MG/DL (ref 65–99)
HBA1C MFR BLD: 6.1 % OF TOTAL HGB
HCT VFR BLD AUTO: 35.9 % (ref 35–45)
HDLC SERPL-MCNC: 52 MG/DL
HGB BLD-MCNC: 11.7 G/DL (ref 11.7–15.5)
LDLC SERPL CALC-MCNC: 82 MG/DL (CALC)
MCH RBC QN AUTO: 28.4 PG (ref 27–33)
MCHC RBC AUTO-ENTMCNC: 32.6 G/DL (ref 32–36)
MCV RBC AUTO: 87.1 FL (ref 80–100)
NONHDLC SERPL-MCNC: 104 MG/DL (CALC)
PLATELET # BLD AUTO: 307 THOUSAND/UL (ref 140–400)
PMV BLD REES-ECKER: 10.2 FL (ref 7.5–12.5)
POTASSIUM SERPL-SCNC: 4.4 MMOL/L (ref 3.5–5.3)
PROT SERPL-MCNC: 6.4 G/DL (ref 6.1–8.1)
RBC # BLD AUTO: 4.12 MILLION/UL (ref 3.8–5.1)
SODIUM SERPL-SCNC: 140 MMOL/L (ref 135–146)
TRIGL SERPL-MCNC: 121 MG/DL
WBC # BLD AUTO: 7.9 THOUSAND/UL (ref 3.8–10.8)

## 2025-02-26 ENCOUNTER — HOSPITAL ENCOUNTER (OUTPATIENT)
Dept: CARDIOLOGY | Facility: HOSPITAL | Age: 80
Discharge: HOME | End: 2025-02-26
Payer: MEDICARE

## 2025-02-26 DIAGNOSIS — I35.0 AORTIC VALVE STENOSIS, ETIOLOGY OF CARDIAC VALVE DISEASE UNSPECIFIED: ICD-10-CM

## 2025-02-26 PROCEDURE — 93306 TTE W/DOPPLER COMPLETE: CPT | Performed by: INTERNAL MEDICINE

## 2025-02-26 PROCEDURE — 93306 TTE W/DOPPLER COMPLETE: CPT

## 2025-02-27 LAB
AORTIC VALVE MEAN GRADIENT: 18 MMHG
AORTIC VALVE PEAK VELOCITY: 2.78 M/S
AV PEAK GRADIENT: 31 MMHG
AVA (PEAK VEL): 1.19 CM2
AVA (VTI): 1.17 CM2
EJECTION FRACTION APICAL 4 CHAMBER: 76
EJECTION FRACTION: 77 %
LEFT ATRIUM VOLUME AREA LENGTH INDEX BSA: 24.8 ML/M2
LEFT VENTRICLE INTERNAL DIMENSION DIASTOLE: 4.86 CM (ref 3.5–6)
LEFT VENTRICULAR OUTFLOW TRACT DIAMETER: 1.64 CM
LV EJECTION FRACTION BIPLANE: 77 %
MITRAL VALVE E/A RATIO: 0.78
MITRAL VALVE E/E' RATIO: 12.15
RIGHT VENTRICLE FREE WALL PEAK S': 19.62 CM/S
RIGHT VENTRICLE PEAK SYSTOLIC PRESSURE: 27 MMHG
TRICUSPID ANNULAR PLANE SYSTOLIC EXCURSION: 2.5 CM

## 2025-03-15 LAB
ALBUMIN/CREAT UR: 16 MG/G CREAT
CREAT UR-MCNC: 130 MG/DL (ref 20–275)
MICROALBUMIN UR-MCNC: 2.1 MG/DL

## 2025-03-18 ENCOUNTER — TELEPHONE (OUTPATIENT)
Dept: PRIMARY CARE | Facility: CLINIC | Age: 80
End: 2025-03-18
Payer: MEDICARE

## 2025-03-18 NOTE — TELEPHONE ENCOUNTER
----- Message from Nancy Lao sent at 3/18/2025  8:36 AM EDT -----  Urine test for kidneys is normal, which means there is no sign of active kidney damage which is great. Thanks,  Nancy Lao, DO

## 2025-04-17 ENCOUNTER — APPOINTMENT (OUTPATIENT)
Dept: VASCULAR SURGERY | Facility: CLINIC | Age: 80
End: 2025-04-17
Payer: MEDICARE

## 2025-04-23 ENCOUNTER — APPOINTMENT (OUTPATIENT)
Dept: VASCULAR SURGERY | Facility: HOSPITAL | Age: 80
End: 2025-04-23
Payer: MEDICARE

## 2025-04-23 VITALS
DIASTOLIC BLOOD PRESSURE: 74 MMHG | WEIGHT: 152 LBS | HEART RATE: 52 BPM | BODY MASS INDEX: 26.09 KG/M2 | SYSTOLIC BLOOD PRESSURE: 161 MMHG

## 2025-04-23 DIAGNOSIS — I65.23 BILATERAL CAROTID ARTERY STENOSIS: Primary | ICD-10-CM

## 2025-04-23 DIAGNOSIS — I70.1 RENAL ARTERY STENOSIS (CMS-HCC): ICD-10-CM

## 2025-04-23 PROCEDURE — 1157F ADVNC CARE PLAN IN RCRD: CPT | Performed by: PHYSICIAN ASSISTANT

## 2025-04-23 PROCEDURE — 99214 OFFICE O/P EST MOD 30 MIN: CPT | Performed by: PHYSICIAN ASSISTANT

## 2025-04-23 PROCEDURE — 1036F TOBACCO NON-USER: CPT | Performed by: PHYSICIAN ASSISTANT

## 2025-04-23 PROCEDURE — 1123F ACP DISCUSS/DSCN MKR DOCD: CPT | Performed by: PHYSICIAN ASSISTANT

## 2025-04-23 PROCEDURE — 1159F MED LIST DOCD IN RCRD: CPT | Performed by: PHYSICIAN ASSISTANT

## 2025-04-23 PROCEDURE — 3077F SYST BP >= 140 MM HG: CPT | Performed by: PHYSICIAN ASSISTANT

## 2025-04-23 PROCEDURE — 3078F DIAST BP <80 MM HG: CPT | Performed by: PHYSICIAN ASSISTANT

## 2025-04-23 RX ORDER — LANOLIN ALCOHOL/MO/W.PET/CERES
1000 CREAM (GRAM) TOPICAL DAILY
COMMUNITY

## 2025-04-23 ASSESSMENT — ENCOUNTER SYMPTOMS
WEAKNESS: 0
SORE THROAT: 0
WHEEZING: 0
COUGH: 0
DIARRHEA: 0
FATIGUE: 0
PALPITATIONS: 0
VOMITING: 0
TROUBLE SWALLOWING: 0
FACIAL ASYMMETRY: 0
ADENOPATHY: 0
DIFFICULTY URINATING: 0
DIZZINESS: 0
ARTHRALGIAS: 0
NECK PAIN: 0
FEVER: 0
LIGHT-HEADEDNESS: 0
HEADACHES: 0
ABDOMINAL PAIN: 0
NUMBNESS: 0
JOINT SWELLING: 0
COLOR CHANGE: 0
SLEEP DISTURBANCE: 0
SEIZURES: 0
CONSTIPATION: 0
SHORTNESS OF BREATH: 0
NAUSEA: 0
CHILLS: 0
CONFUSION: 0
WOUND: 0
SPEECH DIFFICULTY: 0

## 2025-04-23 NOTE — PROGRESS NOTES
Subjective   Patient ID: Nat Thacker is a 80 y.o. female who presents for Follow-up (1 yr carotid).  HPI  80 year old female presents for annual vascular follow up for carotid stenosis and renal artery stenosis. Overall health has been well over the past year. No TIA/CVA. No visual changes or amaurosis fugax. Sees Cardiology and PCP for BP management. Renal function has been stable. Ambulatory, very active. Watches great grandchildren. Does complain of bilateral wrist numbness worse when sleeping, in the process of seeing ortho.    3/29/24 Renal Artery Duplex  CONCLUSIONS:  Right Renal Artery: At the proximal right renal artery there are increased velocities that are consistent with greater than 60% stenosis. The right renal vein is widely patent. Single renal cyst documented in the right kidney. Elevated velocities are noted at curvature of the renal artery(Stenosis vs curvature of vessel).  Left Renal Artery: Left renal arteries demonstrate no evidence of hemodynamically significant stenosis. The left renal veins are widely patent.     Imaging & Doppler Findings:     AORTA    PSV   Mid  68.6 cm/s        Renal Artery Duplex Right Kidney: 9.6 cm                           Left Kidney: 8.9 cm        Systolic       Diastolic     ARTERY           Systolic       Diastolic        141 cm/s                     Origin           142 cm/s        388 cm/s                      Prox            151 cm/s        73 cm/s                        Mid            144 cm/s        82 cm/s                      Distal            74 cm/s        18 cm/s         6 cm/s      Superior           24 cm/s        7 cm/s        25 cm/s         7 cm/s      Inferior           21 cm/s        8 cm/s                         Right                          Left                          5.6       R/A Ratio            2.2                          0.7    Resistive Index         0.7                   Right  Renal Cyst 2.9 cm           Ao Dist Diam 1.46 cm  Mid  Ao       69 cm/s    3/29/24 Carotid Duplex  CONCLUSIONS:  Right Carotid: Findings are consistent with 50 to 69% stenosis of the right proximal internal carotid artery. Turbulent flow seen by color Doppler. Right external carotid artery appears patent with no evidence of stenosis. The right vertebral artery is patent with antegrade flow. No evidence of hemodynamically significant stenosis in the right subclavian artery.  Left Carotid: Findings are consistent with less than 50% stenosis of the left proximal internal carotid artery. Left external carotid artery appears patent with no evidence of stenosis. The left vertebral artery is patent with antegrade flow. No evidence of hemodynamically significant stenosis in the left subclavian artery.     Imaging & Doppler Findings:  Right Plaque Morph: The proximal right internal carotid artery demonstrates calcified plaque. The proximal right external carotid artery demonstrates calcified plaque. The distal right common carotid artery demonstrates calcified plaque.  Left Plaque Morph: The proximal left internal carotid artery demonstrates calcified plaque. The mid left common carotid artery demonstrates smooth plaque. The distal left common carotid artery demonstrates smooth plaque.      Right                        Left    PSV      EDV                PSV      EDV  90 cm/s  25 cm/s   CCA P    82 cm/s  23 cm/s  61 cm/s  19 cm/s   CCA D    83 cm/s  25 cm/s  183 cm/s 46 cm/s   ICA P    68 cm/s  27 cm/s  121 cm/s 35 cm/s   ICA M    129 cm/s 36 cm/s  84 cm/s  29 cm/s   ICA D    74 cm/s  27 cm/s  74 cm/s             ECA     73 cm/s  87 cm/s  32 cm/s Vertebral  63 cm/s  21 cm/s  99 cm/s  0 cm/s  Subclavian 149 cm/s                      Right Left  ICA/CCA Ratio  3.0  0.8        Review of Systems   Constitutional:  Negative for chills, fatigue and fever.   HENT:  Negative for congestion, sore throat and trouble swallowing.    Eyes:  Negative for visual disturbance.   Respiratory:   Negative for cough, shortness of breath and wheezing.    Cardiovascular:  Negative for chest pain, palpitations and leg swelling.   Gastrointestinal:  Negative for abdominal pain, constipation, diarrhea, nausea and vomiting.   Endocrine: Negative for cold intolerance and heat intolerance.   Genitourinary:  Negative for difficulty urinating.   Musculoskeletal:  Negative for arthralgias, joint swelling and neck pain.   Skin:  Negative for color change and wound.   Neurological:  Negative for dizziness, seizures, syncope, facial asymmetry, speech difficulty, weakness, light-headedness, numbness and headaches.   Hematological:  Negative for adenopathy.   Psychiatric/Behavioral:  Negative for behavioral problems, confusion and sleep disturbance.      Vitals:    04/23/25 0932   BP: 161/74   Pulse: 52   Weight: 68.9 kg (152 lb)      Objective   Physical Exam  Constitutional:       Appearance: Normal appearance.   HENT:      Head: Normocephalic.      Right Ear: External ear normal.      Left Ear: External ear normal.      Nose: Nose normal.      Mouth/Throat:      Mouth: Mucous membranes are moist.   Eyes:      Extraocular Movements: Extraocular movements intact.   Neck:      Vascular: Carotid bruit present.   Cardiovascular:      Rate and Rhythm: Normal rate and regular rhythm.      Pulses: Normal pulses.   Pulmonary:      Effort: Pulmonary effort is normal. No respiratory distress.      Breath sounds: Normal breath sounds.   Abdominal:      Palpations: Abdomen is soft.      Tenderness: There is no abdominal tenderness.   Musculoskeletal:         General: No swelling or tenderness.      Cervical back: Normal range of motion and neck supple.      Right lower leg: No edema.      Left lower leg: No edema.   Skin:     General: Skin is warm and dry.      Capillary Refill: Capillary refill takes less than 2 seconds.      Findings: No lesion.   Neurological:      General: No focal deficit present.      Mental Status: She is alert  and oriented to person, place, and time. Mental status is at baseline.   Psychiatric:         Mood and Affect: Mood normal.         Behavior: Behavior normal.         Thought Content: Thought content normal.         Judgment: Judgment normal.         Assessment/Plan   Problem List Items Addressed This Visit    None  Visit Diagnoses         Codes      Bilateral carotid artery stenosis    -  Primary I65.23    Relevant Orders    Vascular US Carotid Artery Duplex Bilateral      Renal artery stenosis (CMS-[facility])     I70.1    Relevant Orders    Vascular US Renal Artery Duplex Complete        80 year old female presents for annual vascular follow up for carotid stenosis and renal artery stenosis.     Carotid artery stenosis  -carotid duplex ultrasound 3/2024 R 50-69% ICA stenosis, left <50% ICA stenosis, Asymptomatic  -Discussed carotid artery disease and indications for surveillance vs intervention  -Discussed results of carotid ultrasound 3/2024, will repeat carotid duplex now, call with results   -continue asa and statin daily  -BP control  -Follow up in office with Dr Carey after CTA to dsicuss results and next steps in treatment  -Reviewed signs and symptoms of TIA/CVA and when to seek urgent medical attention    2. Renal Artery Stenosis  -Bps controlled on 2 antihypertensive medications, renal function stable  -follows with cardiology and pcp for BP and renal function monitoring  -repeat renal artery duplex now, will call with results    -Follow up 1 year most likely   -Discussed above course of care and treatment plan with patient who is in agreement, all questions answered  -Patient to call with new or worsening symptoms. Patient to call with questions or concerns              Gaby Guevara PA-C 04/23/25 10:07 AM

## 2025-04-28 ENCOUNTER — TELEPHONE (OUTPATIENT)
Dept: PRIMARY CARE | Facility: CLINIC | Age: 80
End: 2025-04-28
Payer: MEDICARE

## 2025-04-28 DIAGNOSIS — I10 ESSENTIAL HYPERTENSION: ICD-10-CM

## 2025-04-28 DIAGNOSIS — M79.642 PAIN IN BOTH HANDS: Primary | ICD-10-CM

## 2025-04-28 DIAGNOSIS — M79.641 PAIN IN BOTH HANDS: Primary | ICD-10-CM

## 2025-04-28 RX ORDER — HYDROCHLOROTHIAZIDE 12.5 MG/1
12.5 CAPSULE ORAL DAILY
Qty: 90 CAPSULE | Refills: 1 | Status: SHIPPED | OUTPATIENT
Start: 2025-04-28 | End: 2025-10-25

## 2025-04-28 NOTE — TELEPHONE ENCOUNTER
Referral will be faxed today. She can call [facility] [phone] to schedule.   Thanks,  Nancy Lao, DO

## 2025-04-28 NOTE — TELEPHONE ENCOUNTER
Patient called in to report severe symptoms and difficulty with use of arm/hand carpal tunnel is keeping her up at night. Stated she is ready for a referral to see someone at [facility] in [place] please. Once referral is placed patient gave the okay to leave a message on her home phone with instructions to schedule etc.

## 2025-04-28 NOTE — TELEPHONE ENCOUNTER
----- Message from Nurse Ailyn JORDAN sent at 4/28/2025  1:00 PM EDT -----  Regarding: Refill  Patient is requesting a refill of hydrochlorothiazide 12.5 mg to be sent to [facility] in [place]. Can reach the patient at [phone] if needed.

## 2025-05-20 ENCOUNTER — APPOINTMENT (OUTPATIENT)
Dept: VASCULAR MEDICINE | Facility: HOSPITAL | Age: 80
End: 2025-05-20
Payer: MEDICARE

## 2025-05-20 ENCOUNTER — TELEPHONE (OUTPATIENT)
Dept: CARDIOLOGY | Facility: HOSPITAL | Age: 80
End: 2025-05-20
Payer: MEDICARE

## 2025-05-20 NOTE — TELEPHONE ENCOUNTER
Called PT and spoke with , let him know that we need to change the appointment to June 4th. He verbalized understanding and said he would relay the message.

## 2025-05-28 ENCOUNTER — APPOINTMENT (OUTPATIENT)
Dept: CARDIOLOGY | Facility: HOSPITAL | Age: 80
End: 2025-05-28
Payer: MEDICARE

## 2025-05-28 ENCOUNTER — HOSPITAL ENCOUNTER (OUTPATIENT)
Dept: VASCULAR MEDICINE | Facility: HOSPITAL | Age: 80
Discharge: HOME | End: 2025-05-28
Payer: MEDICARE

## 2025-05-28 DIAGNOSIS — I70.1 RENAL ARTERY STENOSIS: ICD-10-CM

## 2025-05-28 DIAGNOSIS — I65.23 BILATERAL CAROTID ARTERY STENOSIS: ICD-10-CM

## 2025-05-28 DIAGNOSIS — I10 ESSENTIAL (PRIMARY) HYPERTENSION: ICD-10-CM

## 2025-05-28 PROCEDURE — 93880 EXTRACRANIAL BILAT STUDY: CPT

## 2025-05-28 PROCEDURE — 93975 VASCULAR STUDY: CPT | Performed by: SURGERY

## 2025-05-28 PROCEDURE — 93975 VASCULAR STUDY: CPT

## 2025-05-28 PROCEDURE — 93880 EXTRACRANIAL BILAT STUDY: CPT | Performed by: SURGERY

## 2025-06-03 ENCOUNTER — TELEPHONE (OUTPATIENT)
Dept: CARDIOLOGY | Facility: HOSPITAL | Age: 80
End: 2025-06-03
Payer: MEDICARE

## 2025-06-04 ENCOUNTER — APPOINTMENT (OUTPATIENT)
Dept: CARDIOLOGY | Facility: HOSPITAL | Age: 80
End: 2025-06-04
Payer: MEDICARE

## 2025-06-04 VITALS
BODY MASS INDEX: 25.44 KG/M2 | HEART RATE: 62 BPM | HEIGHT: 64 IN | DIASTOLIC BLOOD PRESSURE: 80 MMHG | WEIGHT: 149 LBS | SYSTOLIC BLOOD PRESSURE: 140 MMHG

## 2025-06-04 DIAGNOSIS — Z98.61 STATUS POST PERCUTANEOUS TRANSLUMINAL CORONARY ANGIOPLASTY: ICD-10-CM

## 2025-06-04 DIAGNOSIS — R06.09 EXERTIONAL DYSPNEA: ICD-10-CM

## 2025-06-04 DIAGNOSIS — E78.5 HYPERLIPIDEMIA, UNSPECIFIED HYPERLIPIDEMIA TYPE: ICD-10-CM

## 2025-06-04 DIAGNOSIS — I47.20 WIDE QRS VENTRICULAR TACHYCARDIA (MULTI): ICD-10-CM

## 2025-06-04 DIAGNOSIS — Z86.79 HISTORY OF PAROXYSMAL SUPRAVENTRICULAR TACHYCARDIA: ICD-10-CM

## 2025-06-04 DIAGNOSIS — I25.10 ARTERIOSCLEROSIS OF CORONARY ARTERY: ICD-10-CM

## 2025-06-04 DIAGNOSIS — I10 ESSENTIAL HYPERTENSION: ICD-10-CM

## 2025-06-04 DIAGNOSIS — I35.0 AORTIC VALVE STENOSIS, ETIOLOGY OF CARDIAC VALVE DISEASE UNSPECIFIED: ICD-10-CM

## 2025-06-04 LAB
ATRIAL RATE: 62 BPM
P AXIS: 73 DEGREES
P OFFSET: 210 MS
P ONSET: 161 MS
PR INTERVAL: 128 MS
Q ONSET: 225 MS
QRS COUNT: 10 BEATS
QRS DURATION: 72 MS
QT INTERVAL: 444 MS
QTC CALCULATION(BAZETT): 450 MS
QTC FREDERICIA: 449 MS
R AXIS: 17 DEGREES
T AXIS: 83 DEGREES
T OFFSET: 447 MS
VENTRICULAR RATE: 62 BPM

## 2025-06-04 PROCEDURE — 3077F SYST BP >= 140 MM HG: CPT | Performed by: INTERNAL MEDICINE

## 2025-06-04 PROCEDURE — 1160F RVW MEDS BY RX/DR IN RCRD: CPT | Performed by: INTERNAL MEDICINE

## 2025-06-04 PROCEDURE — 1036F TOBACCO NON-USER: CPT | Performed by: INTERNAL MEDICINE

## 2025-06-04 PROCEDURE — 1159F MED LIST DOCD IN RCRD: CPT | Performed by: INTERNAL MEDICINE

## 2025-06-04 PROCEDURE — 93005 ELECTROCARDIOGRAM TRACING: CPT | Performed by: INTERNAL MEDICINE

## 2025-06-04 PROCEDURE — 3079F DIAST BP 80-89 MM HG: CPT | Performed by: INTERNAL MEDICINE

## 2025-06-04 PROCEDURE — 99214 OFFICE O/P EST MOD 30 MIN: CPT | Performed by: INTERNAL MEDICINE

## 2025-06-04 RX ORDER — ATORVASTATIN CALCIUM 40 MG/1
40 TABLET, FILM COATED ORAL NIGHTLY
Qty: 90 TABLET | Refills: 3 | Status: SHIPPED | OUTPATIENT
Start: 2025-06-04 | End: 2026-06-04

## 2025-06-04 RX ORDER — HYDROCHLOROTHIAZIDE 25 MG/1
25 TABLET ORAL DAILY
Qty: 90 TABLET | Refills: 3 | Status: SHIPPED | OUTPATIENT
Start: 2025-06-04 | End: 2026-06-04

## 2025-06-04 RX ORDER — HYDROCHLOROTHIAZIDE 12.5 MG/1
25 CAPSULE ORAL DAILY
Qty: 180 CAPSULE | Refills: 3 | Status: SHIPPED | OUTPATIENT
Start: 2025-06-04 | End: 2025-06-04

## 2025-06-04 ASSESSMENT — ENCOUNTER SYMPTOMS: OCCASIONAL FEELINGS OF UNSTEADINESS: 0

## 2025-06-04 NOTE — PROGRESS NOTES
"Chief Complaint:   Follow-up (Annual, has some questions)     History Of Present Illness:    Nat Thacker is a 80 y.o. female presenting for annual follow-up.    She has a past medical history of mild aortic valve stenosis, PSVT, post PCI to left circumflex, hypertension , carotid artery disease with 50 to 69% stenosis on the right and unilateral renal artery stenosis.    No history of chest pain, shortness of breath with activities, orthopnea, PND, ankle swelling, palpitations, lightheadedness or loss of consciousness.     In 3084-4097 she had a prolonged hospitalization because of intra-abdominal abscess formation, enterocutaneous fistula formation and was on TPN long-term. During the hospital stay she was found to have paroxysmal SVT and one episode of slow wide-complex tachycardia thought to be idioventricular rhythm or SVT with aberration. Echo showed preserved LV function. She was started on the beta blockers. We performed a stress test (2020) which was abnormal and this led to cardiac catheterization. She underwent stent placement to left circumflex in February 2020.     We did a 7 a monitor that did not show any WCT but one episode of paroxysmal SVT for 36 beats.     EKG today shows normal sinus rhythm.     Echo done on February 12, 2019 and 1/29/20 showed ejection fraction of 65% with mild aortic valve stenosis.      .     Last Recorded Vitals:  Vitals:    06/04/25 1113   BP: 140/80   BP Location: Left arm   Pulse: 62   Weight: 67.6 kg (149 lb)   Height: 1.626 m (5' 4\")       Past Medical History:  She has a past medical history of Abnormal result of other cardiovascular function study (02/14/2020), Adjustment disorder with mixed anxiety and depressed mood (01/23/2024), Anemia (05/05/2023), Colostomy status (Multi) (11/23/2020), Encounter for other preprocedural examination (08/20/2020), Encounter for preprocedural cardiovascular examination (11/19/2020), Fibrosclerosis of breast (01/23/2024), " Fibrosclerosis of unspecified breast, Fistula of intestine (01/08/2021), Nausea (10/05/2020), Other long term (current) drug therapy (07/16/2020), Other symptoms and signs involving the genitourinary system (10/09/2020), Personal history of diseases of the skin and subcutaneous tissue (06/30/2021), Personal history of other diseases of the circulatory system (08/13/2019), Personal history of other diseases of the digestive system, Personal history of other diseases of the digestive system (12/16/2020), Personal history of other diseases of the digestive system (12/15/2020), Personal history of other infectious and parasitic diseases (06/30/2021), Personal history of other malignant neoplasm of skin, Personal history of urinary (tract) infections (10/13/2020), Status post colostomy (Multi) (01/23/2024), and Unspecified abdominal pain (10/05/2020).    Past Surgical History:  She has a past surgical history that includes Other surgical history (02/14/2019); Other surgical history (02/14/2019); Other surgical history (02/14/2019); Other surgical history (08/13/2019); Other surgical history (08/13/2019); Other surgical history (02/19/2020); and Cardiac catheterization (N/A, 3/22/2024).      Social History:  She reports that she has quit smoking. Her smoking use included cigarettes. She has never been exposed to tobacco smoke. She has never used smokeless tobacco. She reports current alcohol use. She reports that she does not use drugs.    Family History:  Family History[1]     Allergies:  Adhesive tape-silicones, Ciprofloxacin, Latex, Lisinopril, and Sulfa (sulfonamide antibiotics)    Outpatient Medications:  Current Outpatient Medications   Medication Instructions    aspirin 81 mg EC tablet Take by mouth.    atorvastatin (LIPITOR) 20 mg, oral, Nightly    cyanocobalamin (VITAMIN B-12) 1,000 mcg, Daily    hydroCHLOROthiazide (MICROZIDE) 12.5 mg, oral, Daily    losartan (COZAAR) 50 mg, oral, 2 times daily    metoprolol  tartrate (LOPRESSOR) 12.5 mg, oral, 2 times daily    multivitamin tablet 1 tablet, Daily       Physical Exam:  Physical Exam  Vitals reviewed.   Constitutional:       Appearance: Normal appearance.   Neck:      Vascular: No carotid bruit or JVD.   Cardiovascular:      Rate and Rhythm: Normal rate and regular rhythm.      Pulses: Normal pulses.      Heart sounds: S1 normal and S2 normal. Murmur heard.      Systolic murmur is present with a grade of 2/6.      Comments: Cardiac murmur radiating to both carotids.  Pulmonary:      Effort: Pulmonary effort is normal. No respiratory distress.      Breath sounds: No wheezing or rales.   Abdominal:      General: Abdomen is flat.      Palpations: Abdomen is soft.   Musculoskeletal:      Right lower leg: No edema.      Left lower leg: No edema.   Skin:     General: Skin is warm.   Neurological:      Mental Status: She is alert and oriented to person, place, and time. Mental status is at baseline.   Psychiatric:         Mood and Affect: Mood normal.         Behavior: Behavior normal.           Last Labs:  CBC -  Lab Results   Component Value Date    WBC 7.9 02/14/2025    HGB 11.7 02/14/2025    HCT 35.9 02/14/2025    MCV 87.1 02/14/2025     02/14/2025       CMP -  Lab Results   Component Value Date    CALCIUM 9.1 02/14/2025    PHOS 5.2 (H) 09/10/2020    PROT 6.4 02/14/2025    ALBUMIN 3.9 02/14/2025    AST 15 02/14/2025    ALT 15 02/14/2025    ALKPHOS 76 02/14/2025    BILITOT 0.3 02/14/2025       LIPID PANEL -   Lab Results   Component Value Date    CHOL 156 02/14/2025    TRIG 121 02/14/2025    HDL 52 02/14/2025    CHHDL 3.0 02/14/2025    LDLF 79 01/27/2023    VLDL 25 12/06/2023    NHDL 104 02/14/2025       RENAL FUNCTION PANEL -   Lab Results   Component Value Date    GLUCOSE 105 (H) 02/14/2025     02/14/2025    K 4.4 02/14/2025     02/14/2025    CO2 26 02/14/2025    ANIONGAP 9 02/14/2025    BUN 25 02/14/2025    CREATININE 1.14 (H) 02/14/2025    CALCIUM 9.1  02/14/2025    PHOS 5.2 (H) 09/10/2020    ALBUMIN 3.9 02/14/2025        Lab Results   Component Value Date    HGBA1C 6.1 (H) 02/14/2025       Last Cardiology Tests:  ECG:  ECG 12 Lead 01/24/2024      Echo:  Transthoracic echo (TTE) complete 02/26/2025      Ejection Fractions:  EF   Date/Time Value Ref Range Status   02/26/2025 10:42 AM 77 %        Cath:  Cardiac Catheterization Procedure 03/22/2024      Stress Test:  Nuclear Stress Test 02/21/2024      Cardiac Imaging:  No results found for this or any previous visit from the past 1095 days.          Assessment/Plan     In summary Ms Thacker is a 80-year-old lady with mild aortic valve stenosis, PSVT.      1- She has mild aortic valve stenosis and is asymptomatic. Monitor with periodic echocardiogram.  Stable in 2024.     2-PSVT- Patient is asymptomatic. On beta blockers.      3-CAD- S/P PCI to LCX (Feb 2020) for high risk stress test. Continue statins aspirin and beta-blockers. She is asymptomatic.  Increase statin dose, LDL around 80, goal below 70.     4-hypertension-blood pressure has been occasionally elevated, fluctuating quite a bit.  Mostly elevated.  Increase hydrochlorothiazide dose continue losartan current dose and if additional treatment may be necessary we will add amlodipine.  Unilateral renal artery stenosis  At this time intervention not indicated maximize medical therapy.        Olivia Santos MD         [1]   Family History  Problem Relation Name Age of Onset    Kidney disease Father      Heart disease Father      Hypertension Other

## 2025-06-18 DIAGNOSIS — I10 ESSENTIAL HYPERTENSION: ICD-10-CM

## 2025-06-19 LAB
BUN SERPL-MCNC: 26 MG/DL (ref 7–25)
CREAT SERPL-MCNC: 1.47 MG/DL (ref 0.6–0.95)
EGFRCR SERPLBLD CKD-EPI 2021: 36 ML/MIN/1.73M2
POTASSIUM SERPL-SCNC: 3.9 MMOL/L (ref 3.5–5.3)
SODIUM SERPL-SCNC: 140 MMOL/L (ref 135–146)

## 2025-06-24 ENCOUNTER — TELEPHONE (OUTPATIENT)
Dept: CARDIOLOGY | Facility: HOSPITAL | Age: 80
End: 2025-06-24
Payer: MEDICARE

## 2025-06-24 DIAGNOSIS — I25.10 ARTERIOSCLEROSIS OF CORONARY ARTERY: ICD-10-CM

## 2025-06-24 DIAGNOSIS — I47.20 WIDE QRS VENTRICULAR TACHYCARDIA (MULTI): ICD-10-CM

## 2025-06-24 DIAGNOSIS — R00.0 TACHYCARDIA: ICD-10-CM

## 2025-06-24 DIAGNOSIS — I65.21 STENOSIS OF RIGHT CAROTID ARTERY: ICD-10-CM

## 2025-06-24 DIAGNOSIS — N18.31 CHRONIC KIDNEY DISEASE, STAGE 3A (MULTI): ICD-10-CM

## 2025-06-24 NOTE — TELEPHONE ENCOUNTER
Her blood pressure was quite low.  That was the lowest she was.  She had most readings in the 110/60 range.  If increasing her fluid intake does not improve her blood pressure readings and symptoms in the next couple days, she should decrease her hydrochlorothiazide back to 12.5 mg daily.  Called her with instructions.  She correctly repeated instructions, verbalized understanding and is agreeable to the plan.  She will continue to log her blood pressures and call back with an update in a couple days.

## 2025-06-24 NOTE — TELEPHONE ENCOUNTER
Called patient to discuss S/S being related to recent lab work indicating dehydration. After reviewing Dr. Santos would like for you to increase fluid over the next 10 days and recheck blood work. Patient reported having BP of 79/62 today, and continued lower blood pressure since increasing the medications last visit and increased headaches and fatigue. Patient is agreeable to plan and verbalized understanding. Staff put in order for bloodwork.

## 2025-06-24 NOTE — TELEPHONE ENCOUNTER
Patient called in, left , wanted to discuss the last two weeks of the new mediation. Has had some symptoms, BP fluctuations, and overall not doing well on new medication. Would like to discuss with care team, and see if can start something different. Will be back home this afternoon.

## 2025-07-03 LAB
ANION GAP SERPL CALCULATED.4IONS-SCNC: 8 MMOL/L (CALC) (ref 7–17)
BUN SERPL-MCNC: 28 MG/DL (ref 7–25)
BUN/CREAT SERPL: 22 (CALC) (ref 6–22)
CALCIUM SERPL-MCNC: 9.3 MG/DL (ref 8.6–10.4)
CHLORIDE SERPL-SCNC: 99 MMOL/L (ref 98–110)
CO2 SERPL-SCNC: 28 MMOL/L (ref 20–32)
CREAT SERPL-MCNC: 1.26 MG/DL (ref 0.6–0.95)
EGFRCR SERPLBLD CKD-EPI 2021: 43 ML/MIN/1.73M2
GLUCOSE SERPL-MCNC: 111 MG/DL (ref 65–99)
POTASSIUM SERPL-SCNC: 4.3 MMOL/L (ref 3.5–5.3)
SODIUM SERPL-SCNC: 135 MMOL/L (ref 135–146)

## 2025-07-08 ENCOUNTER — TELEPHONE (OUTPATIENT)
Dept: CARDIOLOGY | Facility: HOSPITAL | Age: 80
End: 2025-07-08
Payer: MEDICARE

## 2025-07-08 DIAGNOSIS — N18.31 CHRONIC KIDNEY DISEASE, STAGE 3A (MULTI): Primary | ICD-10-CM

## 2025-07-08 NOTE — TELEPHONE ENCOUNTER
Patient called in and I spoke with her at this time and she states that she would like to be scheduled with Dr. Santos / NP/ RN because though she had the blood work done she states that she is getting worse. And that the blood work findings isn't going to show the major increase in the medications and her blood pressure allong with the dizziness/ tastes led and fatigue.      Routed call to [facility] for  support.    Thank you!  Nirmala GODOY

## 2025-07-08 NOTE — TELEPHONE ENCOUNTER
Spoke with patient regarding medication concerns and follow up. Patient states she has stopped taking the 25mg dose of hydrochlorthiazide and cut it back down to 12.5 per Mary Alice GREENBERG direction and states she is feeling better and evening blood pressures are remaining more stable in the evenings. Team is agreeable for her to continue the reduced dose if her light headed, dizziness and nausea have subsided. Patient was also worried about statin dosage, staff educated her on side effects of statin drugs, patient did not report any symptoms and agreed to continue taking the increased dose. Also agreed to follow up in October.

## 2025-07-08 NOTE — TELEPHONE ENCOUNTER
7/9/25  1049  Patient returned call.    Informed patient of results and do have repeat labs done in one month.    Patient verbalized understanding and was agreeable.      7/8/25  1615  BUN/CR/K+ ordered.    Called patient; no answer. Left voice message for patient to return call for lab results.           ----- Message from Olivia Santos sent at 7/8/2025 11:15 AM EDT -----  Results reviewed.  Improved creatinine levels.  Hopefully she is feeling better as well.  I will recheck this kidney function in 4 weeks  ----- Message -----  From: [facility] Results In  Sent: 7/3/2025   9:01 AM EDT  To: Olivia Santos MD

## 2025-07-09 ENCOUNTER — CLINICAL SUPPORT (OUTPATIENT)
Dept: PRIMARY CARE | Facility: CLINIC | Age: 80
End: 2025-07-09
Payer: MEDICARE

## 2025-07-09 ENCOUNTER — TELEPHONE (OUTPATIENT)
Dept: PRIMARY CARE | Facility: CLINIC | Age: 80
End: 2025-07-09

## 2025-07-09 DIAGNOSIS — R35.0 URINARY FREQUENCY: Primary | ICD-10-CM

## 2025-07-09 LAB
POC APPEARANCE, URINE: ABNORMAL
POC BILIRUBIN, URINE: NEGATIVE
POC BLOOD, URINE: NEGATIVE
POC GLUCOSE, URINE: NEGATIVE MG/DL
POC KETONES, URINE: ABNORMAL MG/DL
POC LEUKOCYTES, URINE: ABNORMAL
POC NITRITE,URINE: NEGATIVE
POC PH, URINE: 5.5 PH
POC PROTEIN, URINE: NEGATIVE MG/DL
POC SPECIFIC GRAVITY, URINE: 1.02
POC UROBILINOGEN, URINE: 0.2 EU/DL

## 2025-07-09 PROCEDURE — 81002 URINALYSIS NONAUTO W/O SCOPE: CPT | Performed by: STUDENT IN AN ORGANIZED HEALTH CARE EDUCATION/TRAINING PROGRAM

## 2025-07-09 NOTE — Clinical Note
Pt was taken back to the restroom and gave a clean catch urine sample.  Clean catch urine sample was tested and results were given to Dr Peralta.   Pt's symptoms are:  urine frequency.  Pt stated her Cardiologist has put her on 2 medications that can cause frequent UTI's.  She is having carpal tunnel surgery on Tuesday, July 15.  She was told by her surgeons office if she had a short acting antibiotic she may still have the surgery.  Urine was sent out for a culture.

## 2025-07-09 NOTE — TELEPHONE ENCOUNTER
Patient called in because her heart doctor  doubled a medication that acts as a diuretic.   Patient is concerned she has symptoms of UTI.  I scheduled patient to come in for a nurse visit today 7/9/25  to have a urine culture.   Patient just wanted to leave provider this message as well.

## 2025-07-09 NOTE — PROGRESS NOTES
Pt was taken back to the restroom and gave a clean catch urine sample.  Clean catch urine sample was tested and results were given to Dr Peralta (Dr Lao not in office).   Pt's symptoms are:  urine frequency.  Pt stated her Cardiologist has put her on 2 medications that can cause frequent UTI's.  She is having carpal tunnel surgery on Tuesday, July 15.  She was told by her surgeons office if she had a short acting antibiotic she may still have the surgery.  Urine was sent out for a culture.     [facility]

## 2025-07-11 LAB — BACTERIA UR CULT: NORMAL

## 2025-08-02 LAB
BUN SERPL-MCNC: 25 MG/DL (ref 7–25)
CREAT SERPL-MCNC: 1.29 MG/DL (ref 0.6–0.95)
EGFRCR SERPLBLD CKD-EPI 2021: 42 ML/MIN/1.73M2
POTASSIUM SERPL-SCNC: 4.2 MMOL/L (ref 3.5–5.3)

## 2025-08-02 NOTE — PATIENT INSTRUCTIONS
Please have labs drawn at your earliest convenience.  You should fast 8 hours prior to arriving at the lab - during these 8 hours you may have water, black coffee, and black tea - nothing with calories.    Please keep in mind that results may post immediately to your online portal, even before we have a chance to review them.  Once we have had the opportunity to review we will post comments on tribr or have our staff contact you with results and any instructions.      All  outpatient labs are now being managed by [facility]. Appointments are recommended (though not required) for blood work. [facility] gives priority for patient's with scheduled appointments for the blood draw over walk-ins.     Below is the link to schedule your [facility] appointment for blood work.     [email or web address]    Or you can call [facility] at [phone] to schedule your appointment for blood work.     Thank you for choosing [facility] for your healthcare.   As always if you have any questions or concerns please do not hesitate to call our office at [phone] or through tribr.    Have a great day!  Nancy Lao, DO

## 2025-08-04 ENCOUNTER — APPOINTMENT (OUTPATIENT)
Dept: PRIMARY CARE | Facility: CLINIC | Age: 80
End: 2025-08-04
Payer: MEDICARE

## 2025-08-04 VITALS
DIASTOLIC BLOOD PRESSURE: 74 MMHG | HEART RATE: 68 BPM | SYSTOLIC BLOOD PRESSURE: 122 MMHG | TEMPERATURE: 97.1 F | BODY MASS INDEX: 25.27 KG/M2 | HEIGHT: 64 IN | OXYGEN SATURATION: 99 % | RESPIRATION RATE: 21 BRPM | WEIGHT: 148 LBS

## 2025-08-04 DIAGNOSIS — R73.03 PREDIABETES: Chronic | ICD-10-CM

## 2025-08-04 DIAGNOSIS — E78.1 PURE HYPERTRIGLYCERIDEMIA: Chronic | ICD-10-CM

## 2025-08-04 DIAGNOSIS — I10 ESSENTIAL HYPERTENSION: Primary | Chronic | ICD-10-CM

## 2025-08-04 DIAGNOSIS — G89.29 CHRONIC LEFT-SIDED LOW BACK PAIN WITHOUT SCIATICA: ICD-10-CM

## 2025-08-04 DIAGNOSIS — N18.32 CKD STAGE G3B/A1, GFR 30-44 AND ALBUMIN CREATININE RATIO <30 MG/G (MULTI): Chronic | ICD-10-CM

## 2025-08-04 DIAGNOSIS — M54.50 CHRONIC LEFT-SIDED LOW BACK PAIN WITHOUT SCIATICA: ICD-10-CM

## 2025-08-04 PROCEDURE — 1126F AMNT PAIN NOTED NONE PRSNT: CPT | Performed by: FAMILY MEDICINE

## 2025-08-04 PROCEDURE — G2211 COMPLEX E/M VISIT ADD ON: HCPCS | Performed by: FAMILY MEDICINE

## 2025-08-04 PROCEDURE — 1159F MED LIST DOCD IN RCRD: CPT | Performed by: FAMILY MEDICINE

## 2025-08-04 PROCEDURE — 99214 OFFICE O/P EST MOD 30 MIN: CPT | Performed by: FAMILY MEDICINE

## 2025-08-04 PROCEDURE — 3078F DIAST BP <80 MM HG: CPT | Performed by: FAMILY MEDICINE

## 2025-08-04 PROCEDURE — 3074F SYST BP LT 130 MM HG: CPT | Performed by: FAMILY MEDICINE

## 2025-08-04 PROCEDURE — 1160F RVW MEDS BY RX/DR IN RCRD: CPT | Performed by: FAMILY MEDICINE

## 2025-08-04 RX ORDER — ACETAMINOPHEN AND CODEINE PHOSPHATE 300; 30 MG/1; MG/1
1 TABLET ORAL EVERY 8 HOURS PRN
Qty: 15 TABLET | Refills: 0 | Status: SHIPPED | OUTPATIENT
Start: 2025-08-04 | End: 2025-08-09

## 2025-08-04 SDOH — ECONOMIC STABILITY: FOOD INSECURITY: WITHIN THE PAST 12 MONTHS, THE FOOD YOU BOUGHT JUST DIDN'T LAST AND YOU DIDN'T HAVE MONEY TO GET MORE.: NEVER TRUE

## 2025-08-04 SDOH — ECONOMIC STABILITY: FOOD INSECURITY: WITHIN THE PAST 12 MONTHS, YOU WORRIED THAT YOUR FOOD WOULD RUN OUT BEFORE YOU GOT MONEY TO BUY MORE.: NEVER TRUE

## 2025-08-04 ASSESSMENT — LIFESTYLE VARIABLES
AUDIT-C TOTAL SCORE: 1
SKIP TO QUESTIONS 9-10: 1
HOW MANY STANDARD DRINKS CONTAINING ALCOHOL DO YOU HAVE ON A TYPICAL DAY: 1 OR 2
HOW OFTEN DO YOU HAVE A DRINK CONTAINING ALCOHOL: MONTHLY OR LESS
HOW OFTEN DO YOU HAVE SIX OR MORE DRINKS ON ONE OCCASION: NEVER

## 2025-08-04 ASSESSMENT — PAIN SCALES - GENERAL: PAINLEVEL_OUTOF10: 0-NO PAIN

## 2025-08-04 ASSESSMENT — ENCOUNTER SYMPTOMS
DEPRESSION: 0
LOSS OF SENSATION IN FEET: 0
OCCASIONAL FEELINGS OF UNSTEADINESS: 0

## 2025-08-04 NOTE — ASSESSMENT & PLAN NOTE
Stable per recent labs  Urine microalbumin negative - 3/2025  Orders:    Albumin-Creatinine Ratio, Urine Random; Future    Comprehensive Metabolic Panel; Future    CBC; Future

## 2025-08-04 NOTE — PROGRESS NOTES
"Subjective   Patient ID: Nat Thacker is a 80 y.o. female who presents for Hypertension and Chronic Kidney Disease.  She had labs done and is here for review.    Her father passed away from kidney failure at age 51.     She had carpal tunnel surgery of her left wrist about a month ago and is scheduled to have her right wrist done later this week.        Patient Care Team:  Nancy Lao DO as PCP - General  Nancy Lao DO as PCP - [employer] PCP  Olivia Santos MD as Consulting Physician (Cardiology)  Lorna Wu MD as Surgeon (Colon and Rectal Surgery)  Gaby Guevara PA-C as Physician Assistant (Vascular Surgery)    Current Medications[1]    Objective     Visit Vitals  /74 (BP Location: Right arm, Patient Position: Sitting, BP Cuff Size: Adult)   Pulse 68   Temp 36.2 °C (97.1 °F) (Temporal)   Resp 21   Ht 1.626 m (5' 4\")   Wt 67.1 kg (148 lb)   SpO2 99%   BMI 25.40 kg/m²   Smoking Status Former   BSA 1.74 m²        Constitutional: Well nourished, well developed, appears stated age  Eyes: no scleral icterus, no conjunctival injection  Cardiovascular: regular rate and rhythm, no leg edema  Respiratory: normal respiratory effort, clear to auscultation bilaterally  Skin: Warm, dry. No rashes  Neurologic: Alert, CNs II-XII grossly intact..  Psych: Appropriate mood and affect.        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/A1, GFR 30-44 and albumin creatinine ratio <30 mg/g (Multi)  Stable per recent labs  Urine microalbumin negative - 3/2025  Orders:    Albumin-Creatinine Ratio, Urine Random; Future    Comprehensive Metabolic Panel; Future    CBC; Future      Pure hypertriglyceridemia  Statin increased to 40 mg by cardiology in June  Labs ordered for next visit   Orders:    Lipid Panel; Future    Prediabetes    Orders:    Hemoglobin A1C; " Future    Chronic left-sided low back pain without sciatica    Orders:    acetaminophen-codeine (Tylenol w/ Codeine #3) 300-30 mg tablet; Take 1 tablet by mouth every 8 hours if needed for severe pain (7 - 10) for up to 5 days.           All pertinent lab work and results were reviewed with patient.     Follow up 6 months.     Nancy Lao DO         [1]   Current Outpatient Medications   Medication Sig Dispense Refill    aspirin 81 mg EC tablet Take by mouth.      atorvastatin (Lipitor) 40 mg tablet Take 1 tablet (40 mg) by mouth once daily at bedtime. 90 tablet 3    cyanocobalamin (Vitamin B-12) 1,000 mcg tablet Take 1 tablet (1,000 mcg) by mouth once daily.      hydroCHLOROthiazide (HYDRODiuril) 25 mg tablet Take 1 tablet (25 mg) by mouth once daily. (Patient taking differently: Take 0.5 tablets (12.5 mg) by mouth once daily.) 90 tablet 3    losartan (Cozaar) 50 mg tablet Take 1 tablet (50 mg) by mouth 2 times a day. 180 tablet 3    metoprolol tartrate (Lopressor) 25 mg tablet Take 0.5 tablets (12.5 mg) by mouth 2 times a day. 90 tablet 3    multivitamin tablet Take 1 tablet by mouth once daily.      acetaminophen-codeine (Tylenol w/ Codeine #3) 300-30 mg tablet Take 1 tablet by mouth every 8 hours if needed for severe pain (7 - 10) for up to 5 days. 15 tablet 0     No current facility-administered medications for this visit.

## 2025-08-04 NOTE — ASSESSMENT & PLAN NOTE
Orders:    acetaminophen-codeine (Tylenol w/ Codeine #3) 300-30 mg tablet; Take 1 tablet by mouth every 8 hours if needed for severe pain (7 - 10) for up to 5 days.

## 2025-08-04 NOTE — ASSESSMENT & PLAN NOTE
Statin increased to 40 mg by cardiology in June  Labs ordered for next visit   Orders:    Lipid Panel; Future

## 2025-08-09 DIAGNOSIS — I25.10 ARTERIOSCLEROSIS OF CORONARY ARTERY: ICD-10-CM

## 2025-08-09 DIAGNOSIS — I50.42 CHRONIC COMBINED SYSTOLIC (CONGESTIVE) AND DIASTOLIC (CONGESTIVE) HEART FAILURE: ICD-10-CM

## 2025-08-09 DIAGNOSIS — N18.31 CHRONIC KIDNEY DISEASE, STAGE 3A (MULTI): ICD-10-CM

## 2026-02-09 ENCOUNTER — APPOINTMENT (OUTPATIENT)
Dept: PRIMARY CARE | Facility: CLINIC | Age: 81
End: 2026-02-09
Payer: MEDICARE

## (undated) DEVICE — ACCESS KIT, S-MAK MINI, 4FR 10CM 0.018IN 40CM, NT/PT, ECHO ENHANCE NEEDLE

## (undated) DEVICE — GUIDEWIRE, UNIVERSAL BALANCE MID WEIGHT

## (undated) DEVICE — CLOSURE DEVICE, VASCULAR, ANGIO-SEAL, VIP, 6FR, LF

## (undated) DEVICE — SHEATH, GLIDESHEATH, SLENDER, 5FR 10CM

## (undated) DEVICE — CATHETER, DIAGNOSTIC, DXTERITY, 6 FR, JL 4.0, 100C

## (undated) DEVICE — CATHETER, DIAGNOSTIC, DXTERITY, 6FR JR 4.0, 100CM

## (undated) DEVICE — CATHETER, WEDGE PRESSURE, BALLOON, DOUBLE LUMEN, 5 FR, 110 CM

## (undated) DEVICE — GUIDEWIRE, INQWIRE, 3MM J, .035 X 210CM, FIXED

## (undated) DEVICE — SHEATH, PINNACLE, 10 CM,  6FR INTRODUCER, 6FR DIA, 2.5 CM DIALATOR

## (undated) DEVICE — SHEATH, GLIDESHEATH, SLENDER, 6FR 16CM